# Patient Record
Sex: FEMALE | Employment: FULL TIME | URBAN - METROPOLITAN AREA
[De-identification: names, ages, dates, MRNs, and addresses within clinical notes are randomized per-mention and may not be internally consistent; named-entity substitution may affect disease eponyms.]

---

## 2017-11-11 ENCOUNTER — OFFICE VISIT (OUTPATIENT)
Dept: FAMILY MEDICINE CLINIC | Facility: CLINIC | Age: 37
End: 2017-11-11

## 2017-11-11 VITALS
DIASTOLIC BLOOD PRESSURE: 70 MMHG | TEMPERATURE: 99 F | SYSTOLIC BLOOD PRESSURE: 110 MMHG | HEART RATE: 70 BPM | BODY MASS INDEX: 36 KG/M2 | WEIGHT: 211 LBS

## 2017-11-11 DIAGNOSIS — H60.392 OTHER INFECTIVE ACUTE OTITIS EXTERNA OF LEFT EAR: Primary | ICD-10-CM

## 2017-11-11 PROCEDURE — 99212 OFFICE O/P EST SF 10 MIN: CPT | Performed by: FAMILY MEDICINE

## 2017-11-11 PROCEDURE — 99213 OFFICE O/P EST LOW 20 MIN: CPT | Performed by: FAMILY MEDICINE

## 2017-11-11 NOTE — PROGRESS NOTES
HPI:    Patient ID: Diana Benitez is a 30year old female. HPI  Patient presents with:  Ear Pain: c/o left ear pain  Ear Wax    Review of Systems   Constitutional: Negative. HENT: Positive for ear pain. Respiratory: Negative.              Current

## 2018-01-06 ENCOUNTER — OFFICE VISIT (OUTPATIENT)
Dept: FAMILY MEDICINE CLINIC | Facility: CLINIC | Age: 38
End: 2018-01-06

## 2018-01-06 VITALS
DIASTOLIC BLOOD PRESSURE: 72 MMHG | HEART RATE: 71 BPM | SYSTOLIC BLOOD PRESSURE: 111 MMHG | BODY MASS INDEX: 36.09 KG/M2 | TEMPERATURE: 99 F | HEIGHT: 64.5 IN | WEIGHT: 214 LBS

## 2018-01-06 DIAGNOSIS — Z11.3 ROUTINE SCREENING FOR STI (SEXUALLY TRANSMITTED INFECTION): ICD-10-CM

## 2018-01-06 DIAGNOSIS — Z00.00 ROUTINE GENERAL MEDICAL EXAMINATION AT A HEALTH CARE FACILITY: Primary | ICD-10-CM

## 2018-01-06 PROCEDURE — 99395 PREV VISIT EST AGE 18-39: CPT | Performed by: FAMILY MEDICINE

## 2018-01-06 NOTE — PROGRESS NOTES
HPI:    Patient ID: Kadeem Brooks is a 30year old female. HPI  Patient presents with:  Routine Physical  STD: pt would like STD testing done    History reviewed. No pertinent past medical history. Review of Systems   Constitutional: Negative.     VIKI and normal reflexes. No sensory deficit. Skin:   No suspicious lesions above the waist exam   Psychiatric: Her mood appears not anxious. She does not exhibit a depressed mood.               ASSESSMENT/PLAN:   Routine general medical examination at a [facility]

## 2018-02-03 ENCOUNTER — LAB ENCOUNTER (OUTPATIENT)
Dept: LAB | Age: 38
End: 2018-02-03
Attending: FAMILY MEDICINE
Payer: COMMERCIAL

## 2018-02-03 ENCOUNTER — TELEPHONE (OUTPATIENT)
Dept: FAMILY MEDICINE CLINIC | Facility: CLINIC | Age: 38
End: 2018-02-03

## 2018-02-03 DIAGNOSIS — Z00.00 WELL WOMAN EXAM (NO GYNECOLOGICAL EXAM): Primary | ICD-10-CM

## 2018-02-03 DIAGNOSIS — Z00.00 WELL WOMAN EXAM (NO GYNECOLOGICAL EXAM): ICD-10-CM

## 2018-02-03 DIAGNOSIS — Z00.00 ROUTINE GENERAL MEDICAL EXAMINATION AT A HEALTH CARE FACILITY: ICD-10-CM

## 2018-02-03 LAB
ALBUMIN SERPL BCP-MCNC: 3.6 G/DL (ref 3.5–4.8)
ALBUMIN/GLOB SERPL: 1.1 {RATIO} (ref 1–2)
ALP SERPL-CCNC: 53 U/L (ref 32–100)
ALT SERPL-CCNC: 20 U/L (ref 14–54)
ANION GAP SERPL CALC-SCNC: 7 MMOL/L (ref 0–18)
AST SERPL-CCNC: 21 U/L (ref 15–41)
BASOPHILS # BLD: 0 K/UL (ref 0–0.2)
BASOPHILS NFR BLD: 1 %
BILIRUB SERPL-MCNC: 0.6 MG/DL (ref 0.3–1.2)
BUN SERPL-MCNC: 5 MG/DL (ref 8–20)
BUN/CREAT SERPL: 5.4 (ref 10–20)
CALCIUM SERPL-MCNC: 9.1 MG/DL (ref 8.5–10.5)
CHLORIDE SERPL-SCNC: 106 MMOL/L (ref 95–110)
CHOLEST SERPL-MCNC: 197 MG/DL (ref 110–200)
CO2 SERPL-SCNC: 27 MMOL/L (ref 22–32)
CREAT SERPL-MCNC: 0.93 MG/DL (ref 0.5–1.5)
EOSINOPHIL # BLD: 0.1 K/UL (ref 0–0.7)
EOSINOPHIL NFR BLD: 2 %
ERYTHROCYTE [DISTWIDTH] IN BLOOD BY AUTOMATED COUNT: 13.7 % (ref 11–15)
GLOBULIN PLAS-MCNC: 3.2 G/DL (ref 2.5–3.7)
GLUCOSE SERPL-MCNC: 94 MG/DL (ref 70–99)
HCT VFR BLD AUTO: 40.4 % (ref 35–48)
HDLC SERPL-MCNC: 43 MG/DL
HGB BLD-MCNC: 13.3 G/DL (ref 12–16)
LDLC SERPL CALC-MCNC: 137 MG/DL (ref 0–99)
LYMPHOCYTES # BLD: 1.7 K/UL (ref 1–4)
LYMPHOCYTES NFR BLD: 44 %
MCH RBC QN AUTO: 29.9 PG (ref 27–32)
MCHC RBC AUTO-ENTMCNC: 33.1 G/DL (ref 32–37)
MCV RBC AUTO: 90.4 FL (ref 80–100)
MONOCYTES # BLD: 0.3 K/UL (ref 0–1)
MONOCYTES NFR BLD: 9 %
NEUTROPHILS # BLD AUTO: 1.7 K/UL (ref 1.8–7.7)
NEUTROPHILS NFR BLD: 45 %
NONHDLC SERPL-MCNC: 154 MG/DL
OSMOLALITY UR CALC.SUM OF ELEC: 287 MOSM/KG (ref 275–295)
PLATELET # BLD AUTO: 206 K/UL (ref 140–400)
PMV BLD AUTO: 9.7 FL (ref 7.4–10.3)
POTASSIUM SERPL-SCNC: 4 MMOL/L (ref 3.3–5.1)
PROT SERPL-MCNC: 6.8 G/DL (ref 5.9–8.4)
RBC # BLD AUTO: 4.46 M/UL (ref 3.7–5.4)
SODIUM SERPL-SCNC: 140 MMOL/L (ref 136–144)
TRIGL SERPL-MCNC: 86 MG/DL (ref 1–149)
WBC # BLD AUTO: 3.9 K/UL (ref 4–11)

## 2018-02-03 PROCEDURE — 80053 COMPREHEN METABOLIC PANEL: CPT

## 2018-02-03 PROCEDURE — 87591 N.GONORRHOEAE DNA AMP PROB: CPT

## 2018-02-03 PROCEDURE — 87491 CHLMYD TRACH DNA AMP PROBE: CPT

## 2018-02-03 PROCEDURE — 36415 COLL VENOUS BLD VENIPUNCTURE: CPT

## 2018-02-03 PROCEDURE — 87389 HIV-1 AG W/HIV-1&-2 AB AG IA: CPT

## 2018-02-03 PROCEDURE — 80061 LIPID PANEL: CPT

## 2018-02-03 PROCEDURE — 85025 COMPLETE CBC W/AUTO DIFF WBC: CPT

## 2018-02-03 PROCEDURE — 86780 TREPONEMA PALLIDUM: CPT

## 2018-02-03 NOTE — TELEPHONE ENCOUNTER
Patient was in office asking for a blood test order for all std's including Herpes. Her current order only had clamydia. She is doing the order that she has today and will come back for the others-per patient. Please advise patient when order put in.

## 2018-02-04 LAB
C TRACH DNA SPEC QL NAA+PROBE: NEGATIVE
N GONORRHOEA DNA SPEC QL NAA+PROBE: NEGATIVE

## 2018-02-05 LAB
HIV1+2 AB SERPL QL IA: NONREACTIVE
T PALLIDUM AB SER QL: NEGATIVE

## 2018-02-09 ENCOUNTER — PATIENT MESSAGE (OUTPATIENT)
Dept: FAMILY MEDICINE CLINIC | Facility: CLINIC | Age: 38
End: 2018-02-09

## 2018-02-09 NOTE — TELEPHONE ENCOUNTER
From: Betty Lew  To: Laly Hallman DO  Sent: 2/9/2018 1:36 PM CST  Subject: Test Results Question    Hello,     I haven't heard the results of my most recent blood test. Please advise when it will be available. Thank You.

## 2018-07-13 ENCOUNTER — TELEPHONE (OUTPATIENT)
Dept: FAMILY MEDICINE CLINIC | Facility: CLINIC | Age: 38
End: 2018-07-13

## 2018-07-13 NOTE — TELEPHONE ENCOUNTER
Explained to patient that STD results not posted on MyChart; patient acknowledged, she did not want the results, just curious why not there anymore.

## 2018-07-27 ENCOUNTER — OFFICE VISIT (OUTPATIENT)
Dept: FAMILY MEDICINE CLINIC | Facility: CLINIC | Age: 38
End: 2018-07-27
Payer: COMMERCIAL

## 2018-07-27 VITALS
WEIGHT: 208 LBS | BODY MASS INDEX: 35 KG/M2 | HEART RATE: 65 BPM | SYSTOLIC BLOOD PRESSURE: 118 MMHG | DIASTOLIC BLOOD PRESSURE: 66 MMHG | TEMPERATURE: 99 F

## 2018-07-27 DIAGNOSIS — Z12.4 CERVICAL CANCER SCREENING: ICD-10-CM

## 2018-07-27 DIAGNOSIS — Z01.419 ENCOUNTER FOR GYNECOLOGICAL EXAMINATION WITHOUT ABNORMAL FINDING: ICD-10-CM

## 2018-07-27 DIAGNOSIS — Z01.419 WOMEN'S ANNUAL ROUTINE GYNECOLOGICAL EXAMINATION: Primary | ICD-10-CM

## 2018-07-27 PROCEDURE — 99395 PREV VISIT EST AGE 18-39: CPT | Performed by: FAMILY MEDICINE

## 2018-07-27 NOTE — PROGRESS NOTES
HPI:    Patient ID: Mario Decker is a 30year old female. HPI  Patient presents with:  Eval-G (gynecologic): annual pap smear   Complaint of vaginal discharge. Review of Systems   Constitutional: Negative.     Genitourinary: Positive for vaginal disch

## 2018-07-29 LAB
C TRACH DNA SPEC QL NAA+PROBE: NEGATIVE
GENITAL VAGINOSIS SCREEN: NEGATIVE
N GONORRHOEA DNA SPEC QL NAA+PROBE: NEGATIVE
TRICHOMONAS SCREEN: NEGATIVE

## 2018-08-02 LAB — HPV I/H RISK 1 DNA SPEC QL NAA+PROBE: NEGATIVE

## 2018-08-27 ENCOUNTER — CHARTING TRANS (OUTPATIENT)
Dept: OTHER | Age: 38
End: 2018-08-27

## 2018-10-02 ENCOUNTER — PATIENT MESSAGE (OUTPATIENT)
Dept: FAMILY MEDICINE CLINIC | Facility: CLINIC | Age: 38
End: 2018-10-02

## 2018-10-02 NOTE — TELEPHONE ENCOUNTER
From: Be Peoples  To: Toshia Escobar DO  Sent: 10/2/2018 9:22 AM CDT  Subject: Other    Dr. Soriano Vienna, I just received a bill for 508.78 for the pap that was just done. In the past this cost has always been covered at 100% by my insurance.  I called th

## 2018-10-03 NOTE — TELEPHONE ENCOUNTER
I dont know where this goes, billing should be changed to recognize this is a female well visit, gynecologic exam.

## 2018-10-23 NOTE — TELEPHONE ENCOUNTER
Hospital coding having problems [phone] number, correction request faxed/confirmed to [phone].

## 2018-12-07 ENCOUNTER — OFFICE VISIT (OUTPATIENT)
Dept: OBGYN CLINIC | Facility: CLINIC | Age: 38
End: 2018-12-07
Payer: COMMERCIAL

## 2018-12-07 VITALS
DIASTOLIC BLOOD PRESSURE: 76 MMHG | HEIGHT: 64.5 IN | BODY MASS INDEX: 34.17 KG/M2 | SYSTOLIC BLOOD PRESSURE: 120 MMHG | WEIGHT: 202.63 LBS

## 2018-12-07 DIAGNOSIS — N92.6 IRREGULAR PERIODS: Primary | ICD-10-CM

## 2018-12-07 DIAGNOSIS — N92.0 EXCESSIVE OR FREQUENT MENSTRUATION: ICD-10-CM

## 2018-12-07 PROCEDURE — 99204 OFFICE O/P NEW MOD 45 MIN: CPT | Performed by: OBSTETRICS & GYNECOLOGY
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
## 2018-12-08 VITALS
DIASTOLIC BLOOD PRESSURE: 70 MMHG | WEIGHT: 202.82 LBS | HEART RATE: 67 BPM | TEMPERATURE: 98.2 F | BODY MASS INDEX: 34.63 KG/M2 | RESPIRATION RATE: 16 BRPM | SYSTOLIC BLOOD PRESSURE: 110 MMHG | HEIGHT: 64 IN

## 2018-12-12 ENCOUNTER — PATIENT MESSAGE (OUTPATIENT)
Dept: FAMILY MEDICINE CLINIC | Facility: CLINIC | Age: 38
End: 2018-12-12

## 2018-12-12 ENCOUNTER — HOSPITAL ENCOUNTER (OUTPATIENT)
Dept: ULTRASOUND IMAGING | Age: 38
Discharge: HOME | End: 2018-12-12
Attending: OBSTETRICS & GYNECOLOGY
Payer: COMMERCIAL

## 2018-12-12 DIAGNOSIS — N92.6 IRREGULAR PERIODS: ICD-10-CM

## 2018-12-12 PROCEDURE — 76856 US EXAM PELVIC COMPLETE: CPT | Performed by: OBSTETRICS & GYNECOLOGY

## 2018-12-12 PROCEDURE — 76830 TRANSVAGINAL US NON-OB: CPT | Performed by: OBSTETRICS & GYNECOLOGY

## 2018-12-12 NOTE — TELEPHONE ENCOUNTER
From: Poli Estrada  To: Hakan Freeman DO  Sent: 12/12/2018 9:52 AM CST  Subject: Other    Hello Dr. Radha Odell,    I'm still receiving calls from the billing department regarding my pap. Please let me know why the billing code hasn't been corrected?  My

## 2018-12-18 NOTE — TELEPHONE ENCOUNTER
I can resubmit a different code - I do not know what the insurer is looking for. Please evaluate.  I believe there has been some communication about this in the past.

## 2018-12-19 NOTE — TELEPHONE ENCOUNTER
Dr Jo-Ann Miller, please see 10/2/18 Patient Email encounter. Your CMA handled it.      Please respond to pool: EM [facility] LPN/CMA

## 2018-12-21 NOTE — TELEPHONE ENCOUNTER
Dr Marlena Enrique, please ask your CMA to do as she did for the 10/2/18 issue, she handled that one, there is no documentation in chart of what was done.     Please respond to pool: JERE Jimenez 64 LPN/CMA

## 2018-12-22 NOTE — TELEPHONE ENCOUNTER
Will call billing department on Monday. Office is closed on the weekends. Will call patient once I obtain an update from billing to see what else is needed.

## 2018-12-24 NOTE — TELEPHONE ENCOUNTER
Dr Sophia Juarez billing depart is requesting a new order with the billing code you suggested in October which was Z124 screen for cervical cancer. Once completed I need to fax to billing.    Fax# [phone]

## 2018-12-28 NOTE — TELEPHONE ENCOUNTER
I went into chart, changed Dx and submitted. I am not sure if the actually resubmits the charge or not.

## 2019-01-02 NOTE — TELEPHONE ENCOUNTER
Faxed over Office visit notes to billing department fax# [phone]. Fax was successful. Also notified patient actions that were taken. Patient should contact billing for a status on account.      Billing phone #[phone]  Fax- [phone]  Account

## 2019-01-09 NOTE — TELEPHONE ENCOUNTER
Patient had trouble hearing me over the phone, called from another phone and patient continued to not hear me well. Patient pulled up her account and was able to see that the charges from July were taken care of.

## 2019-06-29 ENCOUNTER — WALK IN (OUTPATIENT)
Dept: URGENT CARE | Age: 39
End: 2019-06-29

## 2019-06-29 VITALS
WEIGHT: 215 LBS | BODY MASS INDEX: 36.7 KG/M2 | TEMPERATURE: 99.6 F | RESPIRATION RATE: 16 BRPM | DIASTOLIC BLOOD PRESSURE: 70 MMHG | HEART RATE: 71 BPM | OXYGEN SATURATION: 99 % | HEIGHT: 64 IN | SYSTOLIC BLOOD PRESSURE: 100 MMHG

## 2019-06-29 DIAGNOSIS — J02.0 STREP PHARYNGITIS: Primary | ICD-10-CM

## 2019-06-29 LAB
INTERNAL PROCEDURAL CONTROLS ACCEPTABLE: YES
S PYO AG THROAT QL IA.RAPID: POSITIVE

## 2019-06-29 PROCEDURE — 87880 STREP A ASSAY W/OPTIC: CPT | Performed by: NURSE PRACTITIONER

## 2019-06-29 PROCEDURE — 99214 OFFICE O/P EST MOD 30 MIN: CPT | Performed by: NURSE PRACTITIONER

## 2019-06-29 RX ORDER — PENICILLIN V POTASSIUM 500 MG/1
500 TABLET ORAL 2 TIMES DAILY
Qty: 20 TABLET | Refills: 0 | Status: SHIPPED | OUTPATIENT
Start: 2019-06-29 | End: 2019-07-09

## 2019-06-29 ASSESSMENT — ENCOUNTER SYMPTOMS
COUGH: 1
HEADACHES: 1
SINUS PAIN: 1
SINUS PRESSURE: 1
HEMATOLOGIC/LYMPHATIC NEGATIVE: 1
SHORTNESS OF BREATH: 0
SORE THROAT: 1
PSYCHIATRIC NEGATIVE: 1
ENDOCRINE NEGATIVE: 1
GASTROINTESTINAL NEGATIVE: 1
FEVER: 0
EYES NEGATIVE: 1
ALLERGIC/IMMUNOLOGIC NEGATIVE: 1
FATIGUE: 1
WEAKNESS: 1

## 2020-02-05 ENCOUNTER — OFFICE VISIT (OUTPATIENT)
Dept: FAMILY MEDICINE CLINIC | Facility: CLINIC | Age: 40
End: 2020-02-05
Payer: COMMERCIAL

## 2020-02-05 VITALS
DIASTOLIC BLOOD PRESSURE: 63 MMHG | HEART RATE: 82 BPM | TEMPERATURE: 100 F | HEIGHT: 64.5 IN | OXYGEN SATURATION: 96 % | SYSTOLIC BLOOD PRESSURE: 118 MMHG | RESPIRATION RATE: 18 BRPM | WEIGHT: 200 LBS | BODY MASS INDEX: 33.73 KG/M2

## 2020-02-05 DIAGNOSIS — J06.9 VIRAL UPPER RESPIRATORY TRACT INFECTION: Primary | ICD-10-CM

## 2020-02-05 DIAGNOSIS — R68.89 FLU-LIKE SYMPTOMS: ICD-10-CM

## 2020-02-05 DIAGNOSIS — R06.00 DYSPNEA, UNSPECIFIED TYPE: ICD-10-CM

## 2020-02-05 LAB
OPERATOR ID: NORMAL
POCT INFLUENZA A: NEGATIVE
POCT INFLUENZA B: NEGATIVE

## 2020-02-05 PROCEDURE — 99213 OFFICE O/P EST LOW 20 MIN: CPT | Performed by: NURSE PRACTITIONER

## 2020-02-05 PROCEDURE — 87502 INFLUENZA DNA AMP PROBE: CPT | Performed by: NURSE PRACTITIONER

## 2020-02-05 RX ORDER — BENZONATATE 200 MG/1
200 CAPSULE ORAL 3 TIMES DAILY PRN
Qty: 15 CAPSULE | Refills: 0 | Status: SHIPPED | OUTPATIENT
Start: 2020-02-05 | End: 2020-02-10

## 2020-02-05 RX ORDER — ALBUTEROL SULFATE 90 UG/1
2 AEROSOL, METERED RESPIRATORY (INHALATION) EVERY 4 HOURS PRN
Qty: 1 INHALER | Refills: 0 | Status: SHIPPED | OUTPATIENT
Start: 2020-02-05

## 2020-02-05 RX ORDER — BENZONATATE 200 MG/1
200 CAPSULE ORAL 3 TIMES DAILY PRN
Qty: 15 CAPSULE | Refills: 0 | Status: SHIPPED | OUTPATIENT
Start: 2020-02-05 | End: 2020-02-05

## 2020-02-05 RX ORDER — ALBUTEROL SULFATE 90 UG/1
2 AEROSOL, METERED RESPIRATORY (INHALATION) EVERY 4 HOURS PRN
Qty: 1 INHALER | Refills: 0 | Status: SHIPPED | OUTPATIENT
Start: 2020-02-05 | End: 2020-02-05

## 2020-02-05 NOTE — PROGRESS NOTES
CHIEF COMPLAINT:   Patient presents with:  Cough  Body ache and/or chills      HPI:   Elida Florez is a 42year old female who presents for upper respiratory symptoms for  2 days.  Patient reports sore throat, congestion, dry cough, body aches, chills, an NOSE: Nostrils patent, clear nasal discharge, nasal mucosa erythematous and swollen. THROAT: Oral mucosa pink, moist. Posterior pharynx is erythematous. No exudates. Tonsils 0/4. NECK: Supple, non-tender  LUNGS: clear to auscultation bilaterally.  Cendant Fliqz You have a viral upper respiratory illness (URI), which is another term for the common cold. This illness is contagious during the first few days. It is spread through the air by coughing and sneezing.  It may also be spread by direct contact (touching the · Over-the-counter cold medicines will not shorten the length of time you’re sick, but they may be helpful for the following symptoms: cough, sore throat, and nasal and sinus congestion.  If you take prescription medicines, ask your healthcare provider or p

## 2021-01-22 ENCOUNTER — TELEMEDICINE (OUTPATIENT)
Dept: FAMILY MEDICINE CLINIC | Facility: CLINIC | Age: 41
End: 2021-01-22

## 2021-01-22 DIAGNOSIS — M75.81 RIGHT ROTATOR CUFF TENDONITIS: ICD-10-CM

## 2021-01-22 DIAGNOSIS — M25.511 ACUTE PAIN OF RIGHT SHOULDER: Primary | ICD-10-CM

## 2021-01-22 PROCEDURE — G2012 BRIEF CHECK IN BY MD/QHP: HCPCS | Performed by: FAMILY MEDICINE

## 2021-01-22 RX ORDER — DICLOFENAC SODIUM 75 MG/1
75 TABLET, DELAYED RELEASE ORAL 2 TIMES DAILY
Qty: 30 TABLET | Refills: 1 | Status: SHIPPED | OUTPATIENT
Start: 2021-01-22 | End: 2021-03-01

## 2021-01-22 NOTE — PROGRESS NOTES
Virtual Telephone Check-In    Mistyobie Alejandro verbally consents to a Virtual/Telephone Check-In visit on 01/22/21. Patient has been referred to the [facility] website at [email or web address] to review the yearly Consent to Treat document.     Patient Dillon Palmer

## 2021-03-01 ENCOUNTER — OFFICE VISIT (OUTPATIENT)
Dept: OBGYN CLINIC | Facility: CLINIC | Age: 41
End: 2021-03-01
Payer: COMMERCIAL

## 2021-03-01 VITALS — BODY MASS INDEX: 34.24 KG/M2 | WEIGHT: 203 LBS | HEIGHT: 64.5 IN

## 2021-03-01 DIAGNOSIS — Z12.4 ROUTINE CERVICAL SMEAR: ICD-10-CM

## 2021-03-01 DIAGNOSIS — N92.6 IRREGULAR PERIODS: ICD-10-CM

## 2021-03-01 DIAGNOSIS — Z11.3 SCREENING EXAMINATION FOR VENEREAL DISEASE: ICD-10-CM

## 2021-03-01 DIAGNOSIS — Z01.419 WOMEN'S ANNUAL ROUTINE GYNECOLOGICAL EXAMINATION: Primary | ICD-10-CM

## 2021-03-01 DIAGNOSIS — Z31.69 PRE-CONCEPTION COUNSELING: ICD-10-CM

## 2021-03-01 PROCEDURE — 99395 PREV VISIT EST AGE 18-39: CPT | Performed by: OBSTETRICS & GYNECOLOGY

## 2021-03-01 PROCEDURE — 99214 OFFICE O/P EST MOD 30 MIN: CPT | Performed by: OBSTETRICS & GYNECOLOGY

## 2021-03-01 PROCEDURE — 88175 CYTOPATH C/V AUTO FLUID REDO: CPT | Performed by: OBSTETRICS & GYNECOLOGY

## 2021-03-01 PROCEDURE — 87205 SMEAR GRAM STAIN: CPT | Performed by: OBSTETRICS & GYNECOLOGY

## 2021-03-01 PROCEDURE — 87808 TRICHOMONAS ASSAY W/OPTIC: CPT | Performed by: OBSTETRICS & GYNECOLOGY

## 2021-03-01 PROCEDURE — 87591 N.GONORRHOEAE DNA AMP PROB: CPT | Performed by: OBSTETRICS & GYNECOLOGY

## 2021-03-01 PROCEDURE — 87491 CHLMYD TRACH DNA AMP PROBE: CPT | Performed by: OBSTETRICS & GYNECOLOGY

## 2021-03-01 PROCEDURE — 3008F BODY MASS INDEX DOCD: CPT | Performed by: OBSTETRICS & GYNECOLOGY

## 2021-03-01 PROCEDURE — 87624 HPV HI-RISK TYP POOLED RSLT: CPT | Performed by: OBSTETRICS & GYNECOLOGY

## 2021-03-01 PROCEDURE — 87106 FUNGI IDENTIFICATION YEAST: CPT | Performed by: OBSTETRICS & GYNECOLOGY

## 2021-03-01 NOTE — PROGRESS NOTES
GYN ANNUAL    3/1/2021  1:20 PM    Patient presents with: Annual: EST/ANNUAL PAP / FERTILITY CONSULT   .    HPI: Patient is a 47year old  LMP 2021 presents for annual gyn exam and to discuss becoming pregnant.  Last seen  for irregular cyc Sexual Activity      Alcohol use:  Yes        Alcohol/week: 0.0 standard drinks        Frequency: Monthly or less        Comment: 1 beer weekly      Drug use: No      Sexual activity: Yes        Partners: Male    Social History    Social History Narrative months  - Return if no success for referral to [facility]      Total time spent = 35 minutes  >50% = counseling regarding pre-conception plan of care        3/1/2021  Deny Johnson MD

## 2021-03-02 LAB
C TRACH DNA SPEC QL NAA+PROBE: NEGATIVE
N GONORRHOEA DNA SPEC QL NAA+PROBE: NEGATIVE

## 2021-03-03 LAB
GENITAL VAGINOSIS SCREEN: NEGATIVE
HPV I/H RISK 1 DNA SPEC QL NAA+PROBE: NEGATIVE
TRICHOMONAS SCREEN: NEGATIVE

## 2021-03-09 ENCOUNTER — TELEPHONE (OUTPATIENT)
Dept: OBGYN CLINIC | Facility: CLINIC | Age: 41
End: 2021-03-09

## 2021-03-09 RX ORDER — FLUCONAZOLE 150 MG/1
150 TABLET ORAL DAILY
Qty: 2 TABLET | Refills: 0 | Status: SHIPPED | OUTPATIENT
Start: 2021-03-09

## 2021-03-09 RX ORDER — CLINDAMYCIN HYDROCHLORIDE 300 MG/1
300 CAPSULE ORAL 2 TIMES DAILY
Qty: 14 CAPSULE | Refills: 0 | Status: SHIPPED | OUTPATIENT
Start: 2021-03-09 | End: 2021-03-16

## 2021-03-09 NOTE — TELEPHONE ENCOUNTER
----- Message from Lynette Ovalle MD sent at 3/4/2021  4:12 PM CST -----  PAP normal, HPV negative. Both BV and yeast detected. Please take oral Diflucan 150 mg x 2 doses 3 days apart followed by 7d of oral Clindamycin 300 mg twice daily.       Tunde Perez

## 2021-06-14 ENCOUNTER — OFFICE VISIT (OUTPATIENT)
Dept: FAMILY MEDICINE CLINIC | Facility: CLINIC | Age: 41
End: 2021-06-14
Payer: COMMERCIAL

## 2021-06-14 ENCOUNTER — TELEPHONE (OUTPATIENT)
Dept: FAMILY MEDICINE CLINIC | Facility: CLINIC | Age: 41
End: 2021-06-14

## 2021-06-14 VITALS
WEIGHT: 202 LBS | HEART RATE: 84 BPM | HEIGHT: 65 IN | DIASTOLIC BLOOD PRESSURE: 78 MMHG | TEMPERATURE: 98 F | SYSTOLIC BLOOD PRESSURE: 118 MMHG | BODY MASS INDEX: 33.66 KG/M2

## 2021-06-14 DIAGNOSIS — M54.9 UPPER BACK PAIN: ICD-10-CM

## 2021-06-14 DIAGNOSIS — G89.11 ACUTE PAIN OF RIGHT SHOULDER DUE TO TRAUMA: ICD-10-CM

## 2021-06-14 DIAGNOSIS — S40.022A CONTUSION OF ARM, LEFT, INITIAL ENCOUNTER: ICD-10-CM

## 2021-06-14 DIAGNOSIS — M25.511 ACUTE PAIN OF RIGHT SHOULDER DUE TO TRAUMA: ICD-10-CM

## 2021-06-14 DIAGNOSIS — S80.01XA CONTUSION OF RIGHT KNEE AND LOWER LEG, INITIAL ENCOUNTER: ICD-10-CM

## 2021-06-14 DIAGNOSIS — S80.11XA CONTUSION OF RIGHT KNEE AND LOWER LEG, INITIAL ENCOUNTER: ICD-10-CM

## 2021-06-14 DIAGNOSIS — T74.91XA DOMESTIC ABUSE OF ADULT, INITIAL ENCOUNTER: Primary | ICD-10-CM

## 2021-06-14 PROCEDURE — 3008F BODY MASS INDEX DOCD: CPT | Performed by: STUDENT IN AN ORGANIZED HEALTH CARE EDUCATION/TRAINING PROGRAM

## 2021-06-14 PROCEDURE — 3074F SYST BP LT 130 MM HG: CPT | Performed by: STUDENT IN AN ORGANIZED HEALTH CARE EDUCATION/TRAINING PROGRAM

## 2021-06-14 PROCEDURE — 3078F DIAST BP <80 MM HG: CPT | Performed by: STUDENT IN AN ORGANIZED HEALTH CARE EDUCATION/TRAINING PROGRAM

## 2021-06-14 PROCEDURE — 99214 OFFICE O/P EST MOD 30 MIN: CPT | Performed by: STUDENT IN AN ORGANIZED HEALTH CARE EDUCATION/TRAINING PROGRAM

## 2021-06-14 RX ORDER — METHOCARBAMOL 750 MG/1
750 TABLET, FILM COATED ORAL 4 TIMES DAILY PRN
Qty: 90 TABLET | Refills: 0 | Status: SHIPPED | OUTPATIENT
Start: 2021-06-14

## 2021-06-14 RX ORDER — IBUPROFEN 600 MG/1
600 TABLET ORAL EVERY 6 HOURS PRN
Qty: 60 TABLET | Refills: 0 | Status: SHIPPED | OUTPATIENT
Start: 2021-06-14
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
## 2021-06-29 PROBLEM — M25.511 ACUTE PAIN OF RIGHT SHOULDER DUE TO TRAUMA: Status: ACTIVE | Noted: 2021-06-29

## 2021-06-29 PROBLEM — T74.91XA DOMESTIC ABUSE OF ADULT, INITIAL ENCOUNTER: Status: ACTIVE | Noted: 2021-06-29

## 2021-06-29 PROBLEM — G89.11 ACUTE PAIN OF RIGHT SHOULDER DUE TO TRAUMA: Status: ACTIVE | Noted: 2021-06-29

## 2021-06-29 PROBLEM — M54.9 UPPER BACK PAIN: Status: ACTIVE | Noted: 2021-06-29
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
## 2021-09-28 ENCOUNTER — PATIENT MESSAGE (OUTPATIENT)
Dept: FAMILY MEDICINE CLINIC | Facility: CLINIC | Age: 41
End: 2021-09-28

## 2021-09-28 DIAGNOSIS — M25.561 CHRONIC PAIN OF RIGHT KNEE: Primary | ICD-10-CM

## 2021-09-28 DIAGNOSIS — G89.29 CHRONIC PAIN OF RIGHT KNEE: Primary | ICD-10-CM
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
## 2021-09-29 ENCOUNTER — HOSPITAL ENCOUNTER (OUTPATIENT)
Dept: GENERAL RADIOLOGY | Age: 41
Discharge: HOME | End: 2021-09-29
Attending: STUDENT IN AN ORGANIZED HEALTH CARE EDUCATION/TRAINING PROGRAM
Payer: COMMERCIAL

## 2021-09-29 DIAGNOSIS — G89.11 ACUTE PAIN OF RIGHT SHOULDER DUE TO TRAUMA: ICD-10-CM

## 2021-09-29 DIAGNOSIS — M25.511 ACUTE PAIN OF RIGHT SHOULDER DUE TO TRAUMA: ICD-10-CM

## 2021-09-29 DIAGNOSIS — T74.91XA DOMESTIC ABUSE OF ADULT, INITIAL ENCOUNTER: ICD-10-CM

## 2021-09-29 NOTE — TELEPHONE ENCOUNTER
Krupa Hughes from Radiology dept in [facility] states patient currently there, but thought Dr. River Garcia was going to order x-ray of right knee as per message below.

## 2021-09-30 ENCOUNTER — TELEMEDICINE (OUTPATIENT)
Dept: FAMILY MEDICINE CLINIC | Facility: CLINIC | Age: 41
End: 2021-09-30
Payer: COMMERCIAL

## 2021-09-30 DIAGNOSIS — M25.561 CHRONIC PAIN OF RIGHT KNEE: Primary | ICD-10-CM

## 2021-09-30 DIAGNOSIS — G89.29 CHRONIC PAIN OF RIGHT KNEE: Primary | ICD-10-CM

## 2021-09-30 DIAGNOSIS — S80.11XA CONTUSION OF RIGHT KNEE AND LOWER LEG, INITIAL ENCOUNTER: ICD-10-CM

## 2021-09-30 DIAGNOSIS — S80.01XA CONTUSION OF RIGHT KNEE AND LOWER LEG, INITIAL ENCOUNTER: ICD-10-CM

## 2021-09-30 PROCEDURE — 99213 OFFICE O/P EST LOW 20 MIN: CPT | Performed by: STUDENT IN AN ORGANIZED HEALTH CARE EDUCATION/TRAINING PROGRAM

## 2021-09-30 NOTE — PROGRESS NOTES
Virtual Telephone Check-In    Elida Florez verbally consents to a Virtual/Telephone Check-In visit on 09/30/21. Patient has been referred to the [facility] website at [email or web address] to review the yearly Consent to Treat document.     Patient Kassandra care above. Coding/billing information is submitted for this visit based on complexity of care and/or time spent for the visit. HPI:    Patient ID: Betty Lew is a 46year old female.     HPI  Pt presenting via video visit with ongoing Right knee pa appropriately. Patient did not appear short of breath. ASSESSMENT/PLAN:   1.  Chronic pain of right knee  - will check knee XR  - encouraged to increase hydration and rest as able  - Tylenol/NSAIDs as needed  - PT for pain management  - follow-up w

## 2021-10-04 ENCOUNTER — HOSPITAL ENCOUNTER (OUTPATIENT)
Dept: GENERAL RADIOLOGY | Age: 41
Discharge: HOME | End: 2021-10-04
Attending: STUDENT IN AN ORGANIZED HEALTH CARE EDUCATION/TRAINING PROGRAM
Payer: COMMERCIAL

## 2021-10-04 DIAGNOSIS — M25.561 CHRONIC PAIN OF RIGHT KNEE: ICD-10-CM

## 2021-10-04 DIAGNOSIS — G89.29 CHRONIC PAIN OF RIGHT KNEE: ICD-10-CM

## 2021-10-04 DIAGNOSIS — S80.01XA CONTUSION OF RIGHT KNEE AND LOWER LEG, INITIAL ENCOUNTER: ICD-10-CM

## 2021-10-04 DIAGNOSIS — S80.11XA CONTUSION OF RIGHT KNEE AND LOWER LEG, INITIAL ENCOUNTER: ICD-10-CM

## 2021-10-04 PROCEDURE — 73562 X-RAY EXAM OF KNEE 3: CPT | Performed by: STUDENT IN AN ORGANIZED HEALTH CARE EDUCATION/TRAINING PROGRAM

## 2021-12-12 ENCOUNTER — TELEPHONE (OUTPATIENT)
Dept: FAMILY MEDICINE CLINIC | Facility: CLINIC | Age: 41
End: 2021-12-12

## 2021-12-12 RX ORDER — PREDNISONE 20 MG/1
TABLET ORAL
Qty: 10 TABLET | Refills: 0 | Status: SHIPPED | OUTPATIENT
Start: 2021-12-12

## 2021-12-12 NOTE — TELEPHONE ENCOUNTER
On call  Patient calling answering service due to loss of voice  Patient reports she had a performance 2 days ago and after that she has been having discomfort in throat as well as hoarseness, she states symptoms have been present for 2 days with no improv

## 2022-07-28 ENCOUNTER — OFFICE VISIT (OUTPATIENT)
Dept: FAMILY MEDICINE CLINIC | Facility: CLINIC | Age: 42
End: 2022-07-28
Payer: COMMERCIAL

## 2022-07-28 VITALS
SYSTOLIC BLOOD PRESSURE: 97 MMHG | DIASTOLIC BLOOD PRESSURE: 64 MMHG | BODY MASS INDEX: 33.32 KG/M2 | HEART RATE: 71 BPM | HEIGHT: 65 IN | WEIGHT: 200 LBS

## 2022-07-28 DIAGNOSIS — G89.29 CHRONIC PAIN OF RIGHT KNEE: ICD-10-CM

## 2022-07-28 DIAGNOSIS — Z00.00 ROUTINE GENERAL MEDICAL EXAMINATION AT A HEALTH CARE FACILITY: Primary | ICD-10-CM

## 2022-07-28 DIAGNOSIS — M25.561 CHRONIC PAIN OF RIGHT KNEE: ICD-10-CM

## 2022-07-28 PROBLEM — T74.91XA DOMESTIC ABUSE OF ADULT, INITIAL ENCOUNTER: Status: RESOLVED | Noted: 2021-06-29 | Resolved: 2022-07-28

## 2022-07-28 PROCEDURE — 3008F BODY MASS INDEX DOCD: CPT | Performed by: FAMILY MEDICINE

## 2022-07-28 PROCEDURE — 99395 PREV VISIT EST AGE 18-39: CPT | Performed by: FAMILY MEDICINE

## 2022-07-28 PROCEDURE — 3074F SYST BP LT 130 MM HG: CPT | Performed by: FAMILY MEDICINE

## 2022-07-28 PROCEDURE — 3078F DIAST BP <80 MM HG: CPT | Performed by: FAMILY MEDICINE

## 2022-08-19 ENCOUNTER — OFFICE VISIT (OUTPATIENT)
Dept: OBGYN CLINIC | Facility: CLINIC | Age: 42
End: 2022-08-19
Payer: COMMERCIAL

## 2022-08-19 VITALS
DIASTOLIC BLOOD PRESSURE: 72 MMHG | SYSTOLIC BLOOD PRESSURE: 100 MMHG | HEIGHT: 64 IN | WEIGHT: 203 LBS | BODY MASS INDEX: 34.66 KG/M2

## 2022-08-19 DIAGNOSIS — Z12.31 BREAST CANCER SCREENING BY MAMMOGRAM: ICD-10-CM

## 2022-08-19 DIAGNOSIS — Z01.419 WOMEN'S ANNUAL ROUTINE GYNECOLOGICAL EXAMINATION: Primary | ICD-10-CM

## 2022-08-19 DIAGNOSIS — Z91.89 AT RISK FOR FERTILITY PROBLEMS: ICD-10-CM

## 2022-08-19 PROCEDURE — 87624 HPV HI-RISK TYP POOLED RSLT: CPT | Performed by: OBSTETRICS & GYNECOLOGY

## 2022-08-19 PROCEDURE — 87491 CHLMYD TRACH DNA AMP PROBE: CPT | Performed by: OBSTETRICS & GYNECOLOGY

## 2022-08-19 PROCEDURE — 87591 N.GONORRHOEAE DNA AMP PROB: CPT | Performed by: OBSTETRICS & GYNECOLOGY

## 2022-08-22 LAB
C TRACH DNA SPEC QL NAA+PROBE: NEGATIVE
HPV I/H RISK 1 DNA SPEC QL NAA+PROBE: NEGATIVE
N GONORRHOEA DNA SPEC QL NAA+PROBE: NEGATIVE

## 2022-09-19 ENCOUNTER — HOSPITAL ENCOUNTER (OUTPATIENT)
Dept: MAMMOGRAPHY | Age: 42
Discharge: HOME | End: 2022-09-19
Attending: OBSTETRICS & GYNECOLOGY

## 2022-09-19 DIAGNOSIS — Z12.31 BREAST CANCER SCREENING BY MAMMOGRAM: ICD-10-CM

## 2022-09-19 PROCEDURE — 77067 SCR MAMMO BI INCL CAD: CPT | Performed by: OBSTETRICS & GYNECOLOGY

## 2022-09-19 PROCEDURE — 77063 BREAST TOMOSYNTHESIS BI: CPT | Performed by: OBSTETRICS & GYNECOLOGY

## 2022-12-13 ENCOUNTER — TELEMEDICINE (OUTPATIENT)
Dept: TELEHEALTH | Age: 42
End: 2022-12-13

## 2022-12-13 DIAGNOSIS — J01.00 ACUTE NON-RECURRENT MAXILLARY SINUSITIS: Primary | ICD-10-CM

## 2022-12-13 PROCEDURE — 99213 OFFICE O/P EST LOW 20 MIN: CPT | Performed by: NURSE PRACTITIONER

## 2022-12-13 RX ORDER — FLUTICASONE PROPIONATE 50 MCG
2 SPRAY, SUSPENSION (ML) NASAL DAILY
Qty: 1 EACH | Refills: 0 | Status: SHIPPED | OUTPATIENT
Start: 2022-12-13

## 2022-12-13 RX ORDER — AMOXICILLIN AND CLAVULANATE POTASSIUM 875; 125 MG/1; MG/1
1 TABLET, FILM COATED ORAL 2 TIMES DAILY
Qty: 14 TABLET | Refills: 0 | Status: SHIPPED | OUTPATIENT
Start: 2022-12-13 | End: 2022-12-20

## 2022-12-14 NOTE — PATIENT INSTRUCTIONS
Medication as prescribed  Continue flonase and decongestant for the next few days  Follow up in person for any new or worsening symptoms.

## 2023-02-23 ENCOUNTER — LAB ENCOUNTER (OUTPATIENT)
Dept: LAB | Age: 43
End: 2023-02-23
Attending: FAMILY MEDICINE
Payer: COMMERCIAL

## 2023-02-23 ENCOUNTER — HOSPITAL ENCOUNTER (OUTPATIENT)
Age: 43
Discharge: HOME | End: 2023-02-23
Payer: COMMERCIAL

## 2023-02-23 VITALS
OXYGEN SATURATION: 100 % | RESPIRATION RATE: 19 BRPM | DIASTOLIC BLOOD PRESSURE: 44 MMHG | SYSTOLIC BLOOD PRESSURE: 124 MMHG | HEART RATE: 60 BPM | TEMPERATURE: 98 F

## 2023-02-23 DIAGNOSIS — H91.92 HEARING DEFICIT, LEFT: Primary | ICD-10-CM

## 2023-02-23 DIAGNOSIS — Z00.00 ROUTINE GENERAL MEDICAL EXAMINATION AT A HEALTH CARE FACILITY: ICD-10-CM

## 2023-02-23 LAB
ALBUMIN SERPL-MCNC: 3.2 G/DL (ref 3.4–5)
ALBUMIN/GLOB SERPL: 0.8 {RATIO} (ref 1–2)
ALP LIVER SERPL-CCNC: 59 U/L
ALT SERPL-CCNC: 19 U/L
ANION GAP SERPL CALC-SCNC: 6 MMOL/L (ref 0–18)
AST SERPL-CCNC: 12 U/L (ref 15–37)
BASOPHILS # BLD AUTO: 0.02 X10(3) UL (ref 0–0.2)
BASOPHILS NFR BLD AUTO: 0.5 %
BILIRUB SERPL-MCNC: 0.5 MG/DL (ref 0.1–2)
BUN BLD-MCNC: 8 MG/DL (ref 7–18)
BUN/CREAT SERPL: 8.8 (ref 10–20)
CALCIUM BLD-MCNC: 8.8 MG/DL (ref 8.5–10.1)
CHLORIDE SERPL-SCNC: 108 MMOL/L (ref 98–112)
CHOLEST SERPL-MCNC: 221 MG/DL (ref ?–200)
CO2 SERPL-SCNC: 27 MMOL/L (ref 21–32)
CREAT BLD-MCNC: 0.91 MG/DL
DEPRECATED RDW RBC AUTO: 43.8 FL (ref 35.1–46.3)
EOSINOPHIL # BLD AUTO: 0.08 X10(3) UL (ref 0–0.7)
EOSINOPHIL NFR BLD AUTO: 1.8 %
ERYTHROCYTE [DISTWIDTH] IN BLOOD BY AUTOMATED COUNT: 13 % (ref 11–15)
EST. AVERAGE GLUCOSE BLD GHB EST-MCNC: 100 MG/DL (ref 68–126)
FASTING PATIENT LIPID ANSWER: YES
FASTING STATUS PATIENT QL REPORTED: YES
GFR SERPLBLD BASED ON 1.73 SQ M-ARVRAT: 82 ML/MIN/1.73M2 (ref 60–?)
GLOBULIN PLAS-MCNC: 3.9 G/DL (ref 2.8–4.4)
GLUCOSE BLD-MCNC: 88 MG/DL (ref 70–99)
HBA1C MFR BLD: 5.1 % (ref ?–5.7)
HCT VFR BLD AUTO: 41.4 %
HDLC SERPL-MCNC: 66 MG/DL (ref 40–59)
HGB BLD-MCNC: 13.6 G/DL
IMM GRANULOCYTES # BLD AUTO: 0.01 X10(3) UL (ref 0–1)
IMM GRANULOCYTES NFR BLD: 0.2 %
LDLC SERPL CALC-MCNC: 141 MG/DL (ref ?–100)
LYMPHOCYTES # BLD AUTO: 1.78 X10(3) UL (ref 1–4)
LYMPHOCYTES NFR BLD AUTO: 40.4 %
MCH RBC QN AUTO: 30.2 PG (ref 26–34)
MCHC RBC AUTO-ENTMCNC: 32.9 G/DL (ref 31–37)
MCV RBC AUTO: 92 FL
MONOCYTES # BLD AUTO: 0.36 X10(3) UL (ref 0.1–1)
MONOCYTES NFR BLD AUTO: 8.2 %
NEUTROPHILS # BLD AUTO: 2.16 X10 (3) UL (ref 1.5–7.7)
NEUTROPHILS # BLD AUTO: 2.16 X10(3) UL (ref 1.5–7.7)
NEUTROPHILS NFR BLD AUTO: 48.9 %
NONHDLC SERPL-MCNC: 155 MG/DL (ref ?–130)
OSMOLALITY SERPL CALC.SUM OF ELEC: 290 MOSM/KG (ref 275–295)
PLATELET # BLD AUTO: 209 10(3)UL (ref 150–450)
POTASSIUM SERPL-SCNC: 4.2 MMOL/L (ref 3.5–5.1)
PROT SERPL-MCNC: 7.1 G/DL (ref 6.4–8.2)
RBC # BLD AUTO: 4.5 X10(6)UL
SODIUM SERPL-SCNC: 141 MMOL/L (ref 136–145)
TRIGL SERPL-MCNC: 82 MG/DL (ref 30–149)
VLDLC SERPL CALC-MCNC: 15 MG/DL (ref 0–30)
WBC # BLD AUTO: 4.4 X10(3) UL (ref 4–11)

## 2023-02-23 PROCEDURE — 80061 LIPID PANEL: CPT

## 2023-02-23 PROCEDURE — 80053 COMPREHEN METABOLIC PANEL: CPT

## 2023-02-23 PROCEDURE — 99213 OFFICE O/P EST LOW 20 MIN: CPT | Performed by: NURSE PRACTITIONER

## 2023-02-23 PROCEDURE — 85025 COMPLETE CBC W/AUTO DIFF WBC: CPT

## 2023-02-23 PROCEDURE — 83036 HEMOGLOBIN GLYCOSYLATED A1C: CPT

## 2023-02-23 PROCEDURE — 36415 COLL VENOUS BLD VENIPUNCTURE: CPT

## 2023-02-27 ENCOUNTER — OFFICE VISIT (OUTPATIENT)
Dept: AUDIOLOGY | Facility: CLINIC | Age: 43
End: 2023-02-27

## 2023-02-27 ENCOUNTER — OFFICE VISIT (OUTPATIENT)
Dept: OTOLARYNGOLOGY | Facility: CLINIC | Age: 43
End: 2023-02-27

## 2023-02-27 DIAGNOSIS — M26.609 TMJ (TEMPOROMANDIBULAR JOINT DISORDER): ICD-10-CM

## 2023-02-27 DIAGNOSIS — H91.90 HEARING LOSS, UNSPECIFIED HEARING LOSS TYPE, UNSPECIFIED LATERALITY: Primary | ICD-10-CM

## 2023-02-27 DIAGNOSIS — Z01.10 ENCOUNTER FOR EXAM OF EARS AND HEARING W/O ABNORMAL FINDINGS: Primary | ICD-10-CM

## 2023-02-27 PROCEDURE — 99203 OFFICE O/P NEW LOW 30 MIN: CPT | Performed by: STUDENT IN AN ORGANIZED HEALTH CARE EDUCATION/TRAINING PROGRAM

## 2023-02-27 PROCEDURE — 92504 EAR MICROSCOPY EXAMINATION: CPT | Performed by: STUDENT IN AN ORGANIZED HEALTH CARE EDUCATION/TRAINING PROGRAM

## 2023-12-19 ENCOUNTER — TELEMEDICINE (OUTPATIENT)
Dept: TELEHEALTH | Age: 43
End: 2023-12-19
Payer: COMMERCIAL

## 2023-12-19 DIAGNOSIS — J32.0 LEFT MAXILLARY SINUSITIS: Primary | ICD-10-CM

## 2023-12-19 PROCEDURE — 99213 OFFICE O/P EST LOW 20 MIN: CPT | Performed by: PHYSICIAN ASSISTANT

## 2023-12-19 RX ORDER — AMOXICILLIN AND CLAVULANATE POTASSIUM 875; 125 MG/1; MG/1
1 TABLET, FILM COATED ORAL 2 TIMES DAILY
Qty: 14 TABLET | Refills: 0 | Status: SHIPPED | OUTPATIENT
Start: 2023-12-19 | End: 2023-12-26

## 2023-12-19 NOTE — PROGRESS NOTES
Virtual/Telephone Check-In    Aren Villafuerte verbally consents to a Virtual/Telephone Check-In service on 12/19/23. Patient has been referred to the [facility] website at [email or web address] to review the yearly Consent to Treat document. Patient understands and accepts financial responsibility for any deductible, co-insurance and/or co-pays associated with this service. Telehealth Verbal Consent   I conducted a telehealth visit with Aren Villafuerte today, 12/19/23, which was completed using two-way, real-time interactive audio and video communication. This has been done in good matthew to provide continuity of care in the best interest of the provider-patient relationship, due to the COVID -19 public health crisis/national emergency where restrictions of face-to-face office visits are ongoing. Every conscious effort was taken to allow for sufficient and adequate time to complete the visit. The patient was made aware of the limitations of the telehealth visit, including treatment limitations as no physical exam could be performed. The patient was advised to call 911 or to go to the ER in case there was an emergency. The patient was also advised of the potential privacy & security concerns related to the telehealth platform. The patient was made aware of where to find [facility] notice of privacy practices, telehealth consent form and other related consent forms and documents. which are located on the [facility] website. The patient verbally agreed to telehealth consent form, related consents and the risks discussed. Lastly, the patient confirmed that they were in [place]. Included in this visit, time may have been spent reviewing labs, medications, radiology tests and decision making. Appropriate medical decision-making and tests are ordered as detailed in the plan of care above. Coding/billing information is submitted for this visit based on complexity of care and/or time spent for the visit.     CHIEF COMPLAINT:     Chief Complaint   Patient presents with    Sinus Problem       HPI:   Bella Reyna is a 41year old female who presents for a video visit. Patient reports ill symptoms for 5 days. Lots of PND and thick discharge. Reports some maxillary sinus pressure mainly on left side. Reports cough due to the PND kept her awake last night. No fever or body aches. Appetite is normal.  Fluid intake is good. Lots of tea. Ears feel stuffy. Throat lozenges, ginger/mint/lemon, Mucinex which is helping make sx more manageable. Home COVID testing negative yesterday. Current Outpatient Medications   Medication Sig Dispense Refill    fluticasone propionate 50 MCG/ACT Nasal Suspension 2 sprays by Each Nare route daily. 1 each 0    Albuterol Sulfate  (90 Base) MCG/ACT Inhalation Aero Soln Inhale 2 puffs into the lungs every 4 (four) hours as needed for Wheezing. 1 Inhaler 0      History reviewed. No pertinent past medical history. Past Surgical History:   Procedure Laterality Date    PATIENT DENIES ANY SURGICAL HISTORY      as of 12/07/2018         Social History     Socioeconomic History    Marital status: Single   Occupational History    Occupation:    Tobacco Use    Smoking status: Never    Smokeless tobacco: Never   Vaping Use    Vaping Use: Never used   Substance and Sexual Activity    Alcohol use:  Yes     Alcohol/week: 0.0 standard drinks of alcohol     Comment: 1 beer weekly    Drug use: No    Sexual activity: Yes     Partners: Male   Other Topics Concern    Blood Transfusions No    Caffeine Concern Yes     Comment: coffee, 1 cup daily   Social History Narrative    No H/O abuse          REVIEW OF SYSTEMS:   GENERAL: normal appetite  SKIN: no rashes or abnormal skin lesions  HEENT: See HPI  LUNGS: denies shortness of breath or wheezing, See HPI  CARDIOVASCULAR: denies chest pain or palpitations       EXAM:   General: Alert, Ill-appearing/sounding, and In no acute distress  Respiratory:   Speaking in full sentences comfortably  Normal work of breathing  Coughing during visit   Head: Normocephalic  Nose: + sounds congested/nasally. Blowing nose frequently  Skin: No obvious rashes or lesions from what observed. No results found for this or any previous visit (from the past 24 hour(s)). ASSESSMENT AND PLAN:   Arturo Murphy is a 41year old female who presents with symptoms that are consistent with    ASSESSMENT:   Encounter Diagnosis   Name Primary? Left maxillary sinusitis Yes       PLAN: Due to hx and unilateral sinus pain will cover for ABRS. Did discuss at 5 days of sx could be viral still, should use Flonase, Mucinex, pain relievers, fluids. Meds as below. See patient Instructions    Meds & Refills for this Visit:  Requested Prescriptions     Signed Prescriptions Disp Refills    amoxicillin clavulanate 875-125 MG Oral Tab 14 tablet 0     Sig: Take 1 tablet by mouth 2 (two) times daily for 7 days. Risks, benefits, and side effects of medication explained and discussed. The patient indicates understanding of these issues and agrees to the plan. The patient is asked to return if sx's persist or worsen. Face to face time spent on Video Visit: 7  Total Time spent on visit including reviewing history, ordering labs/medication, patient examination and education: [address] understands video visit evaluation is not a substitute for face-to-face examination or emergency care. Patient advised to go to ER or call 911 for worsening symptoms or acute distress.

## 2024-01-03 ENCOUNTER — PATIENT MESSAGE (OUTPATIENT)
Dept: OBGYN CLINIC | Facility: CLINIC | Age: 44
End: 2024-01-03

## 2024-01-03 DIAGNOSIS — N97.9 FEMALE INFERTILITY: Primary | ICD-10-CM

## 2024-01-24 ENCOUNTER — OFFICE VISIT (OUTPATIENT)
Dept: OBGYN CLINIC | Facility: CLINIC | Age: 44
End: 2024-01-24
Payer: COMMERCIAL

## 2024-01-24 VITALS
WEIGHT: 206 LBS | DIASTOLIC BLOOD PRESSURE: 68 MMHG | HEIGHT: 64 IN | SYSTOLIC BLOOD PRESSURE: 112 MMHG | BODY MASS INDEX: 35.17 KG/M2

## 2024-01-24 DIAGNOSIS — Z91.89 AT RISK FOR FERTILITY PROBLEMS: ICD-10-CM

## 2024-01-24 DIAGNOSIS — Z12.31 BREAST CANCER SCREENING BY MAMMOGRAM: ICD-10-CM

## 2024-01-24 DIAGNOSIS — Z11.3 SCREENING EXAMINATION FOR VENEREAL DISEASE: ICD-10-CM

## 2024-01-24 DIAGNOSIS — Z01.419 WOMEN'S ANNUAL ROUTINE GYNECOLOGICAL EXAMINATION: ICD-10-CM

## 2024-01-24 DIAGNOSIS — Z12.4 SCREENING FOR CERVICAL CANCER: Primary | ICD-10-CM

## 2024-01-24 PROCEDURE — 99213 OFFICE O/P EST LOW 20 MIN: CPT | Performed by: OBSTETRICS & GYNECOLOGY

## 2024-01-24 PROCEDURE — 87591 N.GONORRHOEAE DNA AMP PROB: CPT | Performed by: OBSTETRICS & GYNECOLOGY

## 2024-01-24 PROCEDURE — 99396 PREV VISIT EST AGE 40-64: CPT | Performed by: OBSTETRICS & GYNECOLOGY

## 2024-01-24 PROCEDURE — 87624 HPV HI-RISK TYP POOLED RSLT: CPT | Performed by: OBSTETRICS & GYNECOLOGY

## 2024-01-24 PROCEDURE — 3078F DIAST BP <80 MM HG: CPT | Performed by: OBSTETRICS & GYNECOLOGY

## 2024-01-24 PROCEDURE — 3074F SYST BP LT 130 MM HG: CPT | Performed by: OBSTETRICS & GYNECOLOGY

## 2024-01-24 PROCEDURE — 3008F BODY MASS INDEX DOCD: CPT | Performed by: OBSTETRICS & GYNECOLOGY

## 2024-01-24 PROCEDURE — 87491 CHLMYD TRACH DNA AMP PROBE: CPT | Performed by: OBSTETRICS & GYNECOLOGY

## 2024-01-24 NOTE — PROGRESS NOTES
GYN ANNUAL    2024  4:53 PM    Chief Complaint   Patient presents with    Annual     Annual exam/pap exam     Other     Pt would like HSG and AMH, FSH levels drawn    .    HPI: Patient is a 43 year old  LMP 24 here for annual gyn exam and PAP with STD screen for pre-fertility evaluation - still needs AMH, FSH, and tubal patency evaluation. Cycles are regular with no other gynecologic concerns. To consult with my partner Dr. Becki Mohr for planning and scheduling of HSG vs in-office Femvue.      OB History    Para Term  AB Living   0 0 0 0 0 0   SAB IAB Ectopic Multiple Live Births   0 0 0 0 0         GYN hx:    Hx Prior Abnormal Pap: No  Pap Date: 22  Pap Result Notes: NEGATIVE PAP HPV NEG  Follow Up Recommendation: last mammo: 2022  CONTRACEPTION: None  LAST MAMMOGRAM: To schedule      Current Outpatient Medications   Medication Sig Dispense Refill    fluticasone propionate 50 MCG/ACT Nasal Suspension 2 sprays by Each Nare route daily. 1 each 0       Past Medical History:   Diagnosis Date    Patient denies medical problems     24     Past Surgical History:   Procedure Laterality Date    PATIENT DENIES ANY SURGICAL HISTORY      as of 2024     No Known Allergies  Family History   Problem Relation Age of Onset    Hypertension Father     Hypertension Mother     Breast Cancer Maternal Aunt     Ovarian Cancer Neg     Uterine Cancer Neg     Colon Cancer Neg      Social History     Socioeconomic History    Marital status: Single   Occupational History    Occupation:    Tobacco Use    Smoking status: Never    Smokeless tobacco: Never   Vaping Use    Vaping Use: Never used   Substance and Sexual Activity    Alcohol use: Yes     Alcohol/week: 0.0 standard drinks of alcohol     Comment: 1 beer weekly    Drug use: No    Sexual activity: Yes     Partners: Male     Social History     Social History Narrative    No H/O abuse        ROS:     Review of  Systems:  A comprehensive 10 point ROS was completed. All pertinent positives and negatives noted in the HPI        /68   Ht 64\"   Wt 206 lb (93.4 kg)   LMP 01/16/2024 (Exact Date)   BMI 35.36 kg/m²     Exam:   GENERAL: well developed, well nourished, in no apparent distress  SKIN: no rashes, no lesions  HEENT: normal  LUNGS: respiration unlabored  CARDIOVASCULAR: no peripheral edema or varicosities, skin warm and dry  BREASTS: bilaterally nontender, no palpable masses, no nipple discharge, no skin changes, no axillary adenopathy  ABDOMEN: Soft, non distended; non tender, no masses  GYNE/:   External Genitalia: normal, no lesions, good perineal support  Urethra: meatus normal  Bladder: well supported  Vagina: normal mucosa, no lesions, no discharge   Uterus: normal size, mobile, nontender  Cervix:  normal os, no lesions or bleeding  Adnexa: normal size, bilaterally nontender, no palpable masses  Cul-de-sac: normal  R/V: normal perineum, no hemorrhoids  EXTREMITIES: nontender without edema      A/P: Patient is 43 year old female here for well-woman exam.     1. Women's annual routine gynecological examination  - PAP today    2. At risk for fertility problems  - Anti-Müllerian Hormone (AMH) ([employer] [employer]); Future  - FSH; Future  - Consult Dr. Mohr to discuss tubal patency evaluation options    3. Screening examination for venereal disease  - Chlamydia/GC PCR Combo; Future    4. Breast cancer screening by mammogram  - [facility] JUVENAL 2D+3D SCREENING BILAT (CPT=77067/63603); Future      Total time spent = 30 minutes  >50% = face to face discussion and coordination of care        1/24/2024  Alicia Joe MD

## 2024-02-03 ENCOUNTER — HOSPITAL ENCOUNTER (OUTPATIENT)
Age: 44
Discharge: HOME | End: 2024-02-03
Payer: COMMERCIAL

## 2024-02-03 VITALS
SYSTOLIC BLOOD PRESSURE: 125 MMHG | TEMPERATURE: 98 F | DIASTOLIC BLOOD PRESSURE: 62 MMHG | RESPIRATION RATE: 20 BRPM | OXYGEN SATURATION: 100 % | HEART RATE: 63 BPM

## 2024-02-03 DIAGNOSIS — H66.002 ACUTE SUPPURATIVE OTITIS MEDIA OF LEFT EAR WITHOUT SPONTANEOUS RUPTURE OF TYMPANIC MEMBRANE, RECURRENCE NOT SPECIFIED: Primary | ICD-10-CM

## 2024-02-03 RX ORDER — AMOXICILLIN AND CLAVULANATE POTASSIUM 875; 125 MG/1; MG/1
1 TABLET, FILM COATED ORAL 2 TIMES DAILY
Qty: 20 TABLET | Refills: 0 | Status: SHIPPED | OUTPATIENT
Start: 2024-02-03 | End: 2024-02-13
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
## 2024-02-03 NOTE — ED PROVIDER NOTES
Patient Seen in: [facility]      History     Chief Complaint   Patient presents with    Ear Problem     Stated Complaint: Ear Problem    Subjective:   HPI    This is a 43-year-old female who presents with difficulty hearing to the left ear, clogged sensation.  Patient states that she has had the same thing intermittently over the last year.  Saw ENT in addition to audiologist last February and states everything was normal.  Patient states had persistent symptoms over the last year.  Did fly in December to [place] and states when she landed started having left ear bloody drainage.  Patient states since then she has not been able to hear fully out of her left ear.  No mastoid tenderness.    Objective:   Past Medical History:   Diagnosis Date    Patient denies medical problems     01/24/24              Past Surgical History:   Procedure Laterality Date    PATIENT DENIES ANY SURGICAL HISTORY      as of 01/24/2024                Social History     Socioeconomic History    Marital status: Single   Occupational History    Occupation:    Tobacco Use    Smoking status: Never    Smokeless tobacco: Never   Vaping Use    Vaping Use: Never used   Substance and Sexual Activity    Alcohol use: Yes     Alcohol/week: 0.0 standard drinks of alcohol     Comment: 1 beer weekly    Drug use: No    Sexual activity: Yes     Partners: Male   Other Topics Concern    Blood Transfusions No    Caffeine Concern Yes     Comment: coffee, 1 cup daily   Social History Narrative    No H/O abuse               Review of Systems   HENT:  Positive for ear discharge and ear pain.    All other systems reviewed and are negative.      Positive for stated complaint: Ear Problem  Other systems are as noted in HPI.  Constitutional and vital signs reviewed.      All other systems reviewed and negative except as noted above.    Physical Exam     ED Triage Vitals [02/03/24 1232]   /62   Pulse 63   Resp 20   Temp 98.1 °F (36.7 °C)    Temp src Temporal   SpO2 100 %   O2 Device None (Room air)       Current:/62   Pulse 63   Temp 98.1 °F (36.7 °C) (Temporal)   Resp 20   LMP 01/16/2024 (Exact Date)   SpO2 100%         Physical Exam  Vitals and nursing note reviewed.   Constitutional:       General: She is awake. She is not in acute distress.     Appearance: Normal appearance. She is not ill-appearing, toxic-appearing or diaphoretic.   HENT:      Head: Normocephalic and atraumatic.      Right Ear: Tympanic membrane, ear canal and external ear normal.      Left Ear: Ear canal and external ear normal. Tympanic membrane is erythematous and bulging.      Nose: Nose normal.      Mouth/Throat:      Lips: Pink.      Mouth: Mucous membranes are moist.      Pharynx: Oropharynx is clear. Uvula midline. No pharyngeal swelling or posterior oropharyngeal erythema.      Tonsils: No tonsillar exudate.   Eyes:      General: Lids are normal.      Extraocular Movements: Extraocular movements intact.      Conjunctiva/sclera: Conjunctivae normal.      Pupils: Pupils are equal, round, and reactive to light.   Cardiovascular:      Rate and Rhythm: Normal rate and regular rhythm.      Pulses: Normal pulses.      Heart sounds: Normal heart sounds.   Pulmonary:      Effort: Pulmonary effort is normal.      Breath sounds: Normal breath sounds.   Skin:     General: Skin is warm and dry.      Capillary Refill: Capillary refill takes less than 2 seconds.   Neurological:      General: No focal deficit present.      Mental Status: She is alert and oriented to person, place, and time.   Psychiatric:         Mood and Affect: Mood normal.         Behavior: Behavior normal. Behavior is cooperative.         Thought Content: Thought content normal.         Judgment: Judgment normal.         ED Course   Labs Reviewed - No data to display       MDM         Medical Decision Making  Patient is well-appearing on exam, nontoxic in appearance, exam as noted above.  Differential  diagnoses including but not limited to otitis media, otitis externa, middle ear effusion.  Examination and symptoms consistent with otitis media.  Will treat with Augmentin which she has tolerated well in the past.  Close follow-up with PCP was recommended.  Patient verbalized plan of care and stated understanding    Problems Addressed:  Acute suppurative otitis media of left ear without spontaneous rupture of tympanic membrane, recurrence not specified: acute illness or injury    Risk  OTC drugs.  Prescription drug management.        Disposition and Plan     Clinical Impression:  1. Acute suppurative otitis media of left ear without spontaneous rupture of tympanic membrane, recurrence not specified         Disposition:  Discharge  2/3/2024  1:02 pm    Follow-up:  Stephon De Anda,   [address] [facility] [address]  [phone]          Colton Reyes MD  [address] [place] [id number]  [phone]                Medications Prescribed:  Discharge Medication List as of 2/3/2024  1:04 PM

## 2024-02-07 ENCOUNTER — LAB ENCOUNTER (OUTPATIENT)
Dept: LAB | Facility: HOSPITAL | Age: 44
End: 2024-02-07
Attending: OBSTETRICS & GYNECOLOGY
Payer: COMMERCIAL

## 2024-02-07 DIAGNOSIS — Z91.89 AT RISK FOR FERTILITY PROBLEMS: ICD-10-CM

## 2024-02-07 LAB — FSH SERPL-ACNC: 3.3 MIU/ML

## 2024-02-07 PROCEDURE — 83001 ASSAY OF GONADOTROPIN (FSH): CPT

## 2024-02-07 PROCEDURE — 83520 IMMUNOASSAY QUANT NOS NONAB: CPT

## 2024-02-07 PROCEDURE — 36415 COLL VENOUS BLD VENIPUNCTURE: CPT

## 2024-02-11 LAB — MULLERIAN AMH: 2.26 NG/ML

## 2024-02-14 ENCOUNTER — OFFICE VISIT (OUTPATIENT)
Dept: OBGYN CLINIC | Facility: CLINIC | Age: 44
End: 2024-02-14
Payer: COMMERCIAL

## 2024-02-14 ENCOUNTER — LAB ENCOUNTER (OUTPATIENT)
Dept: LAB | Facility: REFERENCE LAB | Age: 44
End: 2024-02-14
Attending: FAMILY MEDICINE
Payer: COMMERCIAL

## 2024-02-14 VITALS
BODY MASS INDEX: 35 KG/M2 | DIASTOLIC BLOOD PRESSURE: 52 MMHG | WEIGHT: 205 LBS | SYSTOLIC BLOOD PRESSURE: 114 MMHG | HEIGHT: 64 IN

## 2024-02-14 DIAGNOSIS — N97.9 FEMALE INFERTILITY: Primary | ICD-10-CM

## 2024-02-14 DIAGNOSIS — N97.9 FEMALE INFERTILITY: ICD-10-CM

## 2024-02-14 LAB
ANTIBODY SCREEN: NEGATIVE
BASOPHILS # BLD AUTO: 0.03 X10(3) UL (ref 0–0.2)
BASOPHILS NFR BLD AUTO: 0.6 %
DEPRECATED RDW RBC AUTO: 42.5 FL (ref 35.1–46.3)
EOSINOPHIL # BLD AUTO: 0.27 X10(3) UL (ref 0–0.7)
EOSINOPHIL NFR BLD AUTO: 5.7 %
ERYTHROCYTE [DISTWIDTH] IN BLOOD BY AUTOMATED COUNT: 12.4 % (ref 11–15)
EST. AVERAGE GLUCOSE BLD GHB EST-MCNC: 105 MG/DL (ref 68–126)
HBA1C MFR BLD: 5.3 % (ref ?–5.7)
HBV SURFACE AG SER-ACNC: 0.32 [IU]/L
HBV SURFACE AG SERPL QL IA: NONREACTIVE
HCT VFR BLD AUTO: 38.4 %
HCV AB SERPL QL IA: NONREACTIVE
HGB BLD-MCNC: 13.1 G/DL
IMM GRANULOCYTES # BLD AUTO: 0.01 X10(3) UL (ref 0–1)
IMM GRANULOCYTES NFR BLD: 0.2 %
LYMPHOCYTES # BLD AUTO: 2.09 X10(3) UL (ref 1–4)
LYMPHOCYTES NFR BLD AUTO: 43.8 %
MCH RBC QN AUTO: 31.5 PG (ref 26–34)
MCHC RBC AUTO-ENTMCNC: 34.1 G/DL (ref 31–37)
MCV RBC AUTO: 92.3 FL
MONOCYTES # BLD AUTO: 0.33 X10(3) UL (ref 0.1–1)
MONOCYTES NFR BLD AUTO: 6.9 %
NEUTROPHILS # BLD AUTO: 2.04 X10 (3) UL (ref 1.5–7.7)
NEUTROPHILS # BLD AUTO: 2.04 X10(3) UL (ref 1.5–7.7)
NEUTROPHILS NFR BLD AUTO: 42.8 %
PLATELET # BLD AUTO: 188 10(3)UL (ref 150–450)
PROLACTIN SERPL-MCNC: 10.3 NG/ML
RBC # BLD AUTO: 4.16 X10(6)UL
RH BLOOD TYPE: POSITIVE
RUBV IGG SER QL: POSITIVE
RUBV IGG SER-ACNC: 238.6 IU/ML (ref 10–?)
T PALLIDUM AB SER QL IA: NONREACTIVE
T3FREE SERPL-MCNC: 2.8 PG/ML (ref 2.4–4.2)
T4 FREE SERPL-MCNC: 1.1 NG/DL (ref 0.8–1.7)
TSI SER-ACNC: 0.47 MIU/ML (ref 0.55–4.78)
WBC # BLD AUTO: 4.8 X10(3) UL (ref 4–11)

## 2024-02-14 PROCEDURE — 86803 HEPATITIS C AB TEST: CPT

## 2024-02-14 PROCEDURE — 83021 HEMOGLOBIN CHROMOTOGRAPHY: CPT

## 2024-02-14 PROCEDURE — 86762 RUBELLA ANTIBODY: CPT

## 2024-02-14 PROCEDURE — 84146 ASSAY OF PROLACTIN: CPT | Performed by: OBSTETRICS & GYNECOLOGY

## 2024-02-14 PROCEDURE — 87340 HEPATITIS B SURFACE AG IA: CPT

## 2024-02-14 PROCEDURE — 84443 ASSAY THYROID STIM HORMONE: CPT

## 2024-02-14 PROCEDURE — 83020 HEMOGLOBIN ELECTROPHORESIS: CPT

## 2024-02-14 PROCEDURE — 99213 OFFICE O/P EST LOW 20 MIN: CPT | Performed by: OBSTETRICS & GYNECOLOGY

## 2024-02-14 PROCEDURE — 86901 BLOOD TYPING SEROLOGIC RH(D): CPT

## 2024-02-14 PROCEDURE — 87086 URINE CULTURE/COLONY COUNT: CPT

## 2024-02-14 PROCEDURE — 86900 BLOOD TYPING SEROLOGIC ABO: CPT

## 2024-02-14 PROCEDURE — 87147 CULTURE TYPE IMMUNOLOGIC: CPT

## 2024-02-14 PROCEDURE — 83036 HEMOGLOBIN GLYCOSYLATED A1C: CPT

## 2024-02-14 PROCEDURE — 84481 FREE ASSAY (FT-3): CPT

## 2024-02-14 PROCEDURE — 86850 RBC ANTIBODY SCREEN: CPT

## 2024-02-14 PROCEDURE — 87389 HIV-1 AG W/HIV-1&-2 AB AG IA: CPT

## 2024-02-14 PROCEDURE — 86780 TREPONEMA PALLIDUM: CPT

## 2024-02-14 PROCEDURE — 85025 COMPLETE CBC W/AUTO DIFF WBC: CPT

## 2024-02-14 PROCEDURE — 36415 COLL VENOUS BLD VENIPUNCTURE: CPT

## 2024-02-14 PROCEDURE — 84439 ASSAY OF FREE THYROXINE: CPT

## 2024-02-14 NOTE — PATIENT INSTRUCTIONS
1.) Do lab work including carrier screen  2.) Partner to get semen analysis  3.) schedule Femvue to evaluate uterus and fallopian tubes  4.) see Reproductive Endocrinologist.

## 2024-02-14 NOTE — PROGRESS NOTES
CC: Patient is here for fertility evaluation. Patient of Dr Joe    HPI: Patient is a 43 year old  for above.     She has had unprotected sex for 1 year. Partner age 38, had previous child who passed away due to birth defects, premature. He had normal SA 4 years ago.     She previous was  and had an ex and did not get pregnant.     Menses: once a month, no skipped periods, no heavy bleeding. No hx of fibroids. No excessive pain with pain.   Patient's last menstrual period was 2024.    OB History    Para Term  AB Living   0 0 0 0 0 0   SAB IAB Ectopic Multiple Live Births   0 0 0 0 0       GYN hx:       LPS:  + hx of chlamydia in HS      Past Surgical History:   Procedure Laterality Date    PATIENT DENIES ANY SURGICAL HISTORY      as of 2024     No Known Allergies  Family History   Problem Relation Age of Onset    Hypertension Mother     Other (brain aneurysm) Father 55    Hypertension Father     No Known Problems Brother     No Known Problems Brother     No Known Problems Brother     Breast Cancer Maternal Aunt 70    Ovarian Cancer Neg     Uterine Cancer Neg     Colon Cancer Neg      Social History     Socioeconomic History    Marital status: Single     Spouse name: Not on file    Number of children: Not on file    Years of education: Not on file    Highest education level: Not on file   Occupational History    Occupation:      Comment: @ [employer]   Tobacco Use    Smoking status: Never    Smokeless tobacco: Never   Vaping Use    Vaping Use: Never used   Substance and Sexual Activity    Alcohol use: Yes     Alcohol/week: 0.0 standard drinks of alcohol     Comment: 1 beer weekly    Drug use: No    Sexual activity: Yes     Partners: Male   Other Topics Concern     Service Not Asked    Blood Transfusions No    Caffeine Concern Yes     Comment: coffee, 1 cup daily    Occupational Exposure Not Asked    Hobby Hazards Not Asked    Sleep Concern Not Asked     Stress Concern Not Asked    Weight Concern Not Asked    Special Diet Not Asked    Back Care Not Asked    Exercise Not Asked    Bike Helmet Not Asked    Seat Belt Not Asked    Self-Exams Not Asked   Social History Narrative    No H/O abuse     Feels safe    Lives alone     Social Determinants of Health     Financial Resource Strain: Not on file   Food Insecurity: Not on file   Transportation Needs: Not on file   Physical Activity: Not on file   Stress: Not on file   Social Connections: Not on file   Housing Stability: Not on file       Medications reviewed. See active list.     /52   Ht 64\"   Wt 205 lb (93 kg)   LMP 02/09/2024   BMI 35.19 kg/m²       Exam:   GENERAL: well developed, well nourished, in no apparent distress    Component      Latest Ref Rng 2/7/2024   FSH      No established range for female sex mIU/mL 3.3    [facility] AMH      ng/mL 2.26          A/P: Patient is 43 year old female     1. Female infertility  - REPRODUCTIVE ENDOCRINOLOGY - EXTERNAL  - TSH W Reflex To Free T4; Future  - Prolactin  - CBC W Differential W Platelet; Future  - Rubella, IGG; Future  - Antibody Screen; Future  - T PALLIDUM SCREENING CASCADE; Future  - Hepatitis B Surface Antigen; Future  - Blood Type, ABO And Rh D; Future  - Urine Culture, Routine; Future  - HIV AG AB Combo; Future  - HCV Antibody; Future  - Hemoglobin Electrophoresis w/Rflx Alpha,beta Chain; Future  - Hemoglobin A1C; Future  - Sonohysterogram GYNE Only [[id number]]; Future    Check above   Partner referred for SA.   DW pt given age greater than 35 and 1 year of unprotected sex, she would be best served by [facility] and likely require IVF. DW pt the procedure.   Recommend use ovulation predictor kits in the meantime.   Will do FemVue tomorrow.     Becki Hammond MD

## 2024-02-15 ENCOUNTER — OFFICE VISIT (OUTPATIENT)
Dept: OBGYN CLINIC | Facility: CLINIC | Age: 44
End: 2024-02-15
Payer: COMMERCIAL

## 2024-02-15 VITALS
DIASTOLIC BLOOD PRESSURE: 68 MMHG | WEIGHT: 205 LBS | HEIGHT: 64 IN | SYSTOLIC BLOOD PRESSURE: 118 MMHG | BODY MASS INDEX: 35 KG/M2

## 2024-02-15 DIAGNOSIS — N97.9 FEMALE INFERTILITY: Primary | ICD-10-CM

## 2024-02-15 LAB
CONTROL LINE PRESENT WITH A CLEAR BACKGROUND (YES/NO): YES YES/NO
KIT LOT #: NORMAL NUMERIC
PREGNANCY TEST, URINE: NEGATIVE

## 2024-02-16 NOTE — PROGRESS NOTES
Here for Femvue for unexplained infertility. DW pt the procedure and risk including bleeding, infection. Dw pt to call if worsening LAP, fever, chills, nausea, vomiting      Femvue performed sterilel.     Transvaginal images taken.  Saline infused hysterosonogram and Femvue performed.  Double endometrial wall combined measurement is 5mm- no abnormalities seen within endo.  Bilateral Fallopian tubes appear patent

## 2024-02-21 LAB
HGB A2 MFR BLD: 2.6 % (ref 1.5–3.5)
HGB PNL BLD ELPH: 97.4 % (ref 95.5–100)

## 2024-02-26 ENCOUNTER — HOSPITAL ENCOUNTER (OUTPATIENT)
Age: 44
Discharge: HOME | End: 2024-02-26
Payer: COMMERCIAL

## 2024-02-26 VITALS
SYSTOLIC BLOOD PRESSURE: 134 MMHG | DIASTOLIC BLOOD PRESSURE: 68 MMHG | OXYGEN SATURATION: 100 % | HEART RATE: 70 BPM | RESPIRATION RATE: 20 BRPM | TEMPERATURE: 98 F

## 2024-02-26 DIAGNOSIS — S69.92XA INJURY OF LEFT THUMBNAIL, INITIAL ENCOUNTER: Primary | ICD-10-CM

## 2024-02-26 PROCEDURE — A4570 SPLINT: HCPCS | Performed by: NURSE PRACTITIONER

## 2024-02-26 PROCEDURE — 99213 OFFICE O/P EST LOW 20 MIN: CPT | Performed by: NURSE PRACTITIONER

## 2024-02-26 NOTE — ED INITIAL ASSESSMENT (HPI)
Pt's left thumb nail partially came off after hitting the car door a week ago and is connected to fake nail on top; no signs of infection

## 2024-02-26 NOTE — ED PROVIDER NOTES
Patient Seen in: [facility]      History     Chief Complaint   Patient presents with    Finger Injury     Stated Complaint: Arm or Hand Injury - Nail partially hanging form the nail bed due to injury wit*    Subjective:   HPI  Patient is a 43-year-old female who presents to immediate care center with concern for injury to her left thumbnail.  1 week ago she was getting into a car and jammed of the thumbnail on the car door.  She has had mild pain since that time.  She recognizes that the nail is loose against the matrix, causing her presentation here today.  She has had no motor or sensory deficit.  She been cleaning it at home with hydrogen peroxide.  There has been no redness or drainage.          Objective:   Past Medical History:   Diagnosis Date    Patient denies medical problems     01/24/24              Past Surgical History:   Procedure Laterality Date    PATIENT DENIES ANY SURGICAL HISTORY      as of 01/24/2024                Social History     Socioeconomic History    Marital status: Single   Occupational History    Occupation:      Comment: @ [employer]   Tobacco Use    Smoking status: Never    Smokeless tobacco: Never   Vaping Use    Vaping Use: Never used   Substance and Sexual Activity    Alcohol use: Yes     Alcohol/week: 0.0 standard drinks of alcohol     Comment: 1 beer weekly    Drug use: No    Sexual activity: Yes     Partners: Male   Other Topics Concern    Blood Transfusions No    Caffeine Concern Yes     Comment: coffee, 1 cup daily   Social History Narrative    No H/O abuse     Feels safe    Lives alone              Review of Systems   Constitutional: Negative.    Musculoskeletal:  Negative for arthralgias.   Skin:  Negative for wound.   Neurological:  Negative for weakness and numbness.       Positive for stated complaint: Arm or Hand Injury - Nail partially hanging form the nail bed due to injury wit*  Other systems are as noted in HPI.  Constitutional and  vital signs reviewed.      All other systems reviewed and negative except as noted above.    Physical Exam     ED Triage Vitals [02/26/24 0833]   /68   Pulse 70   Resp 20   Temp 97.5 °F (36.4 °C)   Temp src Temporal   SpO2 100 %   O2 Device None (Room air)       Current:/68   Pulse 70   Temp 97.5 °F (36.4 °C) (Temporal)   Resp 20   LMP 02/09/2024   SpO2 100%         Physical Exam  Vitals and nursing note reviewed.   Constitutional:       General: She is not in acute distress.  Cardiovascular:      Pulses: Normal pulses.   Musculoskeletal:      Left hand: Tenderness present. No swelling, deformity, lacerations or bony tenderness. Normal range of motion. Normal sensation. Normal capillary refill.      Comments: The left thumbnail is intact within the margins of the paronychia bilaterally as well as the eponychium.  The nail is loose: It does appear to be detached at the distal half of the nail from the matrix; however, the proximal half of the nail does still appear attached to the matrix.  Skin surrounding is nonerythematous, not warm, not swollen, no drainage.   Skin:     General: Skin is warm and dry.      Findings: No erythema.   Neurological:      Mental Status: She is alert.   Psychiatric:         Behavior: Behavior normal.               ED Course   Labs Reviewed - No data to display  We discussed treatment options with patient in detail.  There does not appear to be any subungual hematoma (although she is wearing acrylic nail making this somewhat difficult to assess); there does not appear to be any injury to the nail matrix.  With the nail being intact and in place within the margins of the paronychia and the eponychial him, she was advised that there is not an indication at this time to avulse the nail.  It would be best to simply protect the nail with splinting to keep it from pulling on anything and becoming fully detached.  She was fitted with a finger splint here today to protect the  nail.                 MDM      Patient will continue to clean twice daily with soap and water apply Neosporin.  She was encouraged to discontinue her use of hydrogen peroxide.  She will wear the splint to protect the nail while it continues to grow.  She was informed that at some point as the nail grows the detached area will likely fall off.  She states understanding agrees with plan.                                 Medical Decision Making  Differential diagnoses considered today include, but are not exclusive of: Nail avulsion; laceration of the nailbed; abdominal hematoma; infection.    Problems Addressed:  Injury of left thumbnail, initial encounter: self-limited or minor problem    Risk  OTC drugs.        Disposition and Plan     Clinical Impression:  1. Injury of left thumbnail, initial encounter         Disposition:  Discharge  2/26/2024  9:02 am    Follow-up:  Stephon De Anda, DO  [address] [facility] [address]  [phone]      As needed          Medications Prescribed:  Discharge Medication List as of 2/26/2024  9:03 AM

## 2024-03-04 ENCOUNTER — TELEPHONE (OUTPATIENT)
Dept: OBGYN CLINIC | Facility: CLINIC | Age: 44
End: 2024-03-04

## 2024-03-04 NOTE — PATIENT INSTRUCTIONS
1000mg Tylenol and 600mg Ibuprofen every 6 hours (with food) as needed for pain and inflammation  Can be combined with Methocarbamol muscle relaxer Detail Level: Detailed Detail Level: Simple Detail Level: Generalized Detail Level: Zone

## 2024-03-04 NOTE — TELEPHONE ENCOUNTER
Rn spoke with pt, verified name and . 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 RN told pt that her partner should be tested. RN provided pt with number for [employer] genetic counsel[facility] and emailed report to pt. RN told pt to have her partner call the office to set up testing. Pt verbalized understanding and agreed with POC.

## 2024-03-29 ENCOUNTER — TELEPHONE (OUTPATIENT)
Dept: OBGYN CLINIC | Facility: CLINIC | Age: 44
End: 2024-03-29

## 2024-05-06 ENCOUNTER — HOSPITAL ENCOUNTER (OUTPATIENT)
Dept: MAMMOGRAPHY | Age: 44
Discharge: HOME | End: 2024-05-06
Attending: OBSTETRICS & GYNECOLOGY
Payer: COMMERCIAL

## 2024-05-06 DIAGNOSIS — Z12.31 BREAST CANCER SCREENING BY MAMMOGRAM: ICD-10-CM

## 2024-05-06 PROCEDURE — 77063 BREAST TOMOSYNTHESIS BI: CPT | Performed by: OBSTETRICS & GYNECOLOGY

## 2024-05-06 PROCEDURE — 77067 SCR MAMMO BI INCL CAD: CPT | Performed by: OBSTETRICS & GYNECOLOGY

## 2024-05-15 ENCOUNTER — HOSPITAL ENCOUNTER (OUTPATIENT)
Dept: ULTRASOUND IMAGING | Facility: HOSPITAL | Age: 44
Discharge: HOME | End: 2024-05-15
Attending: OBSTETRICS & GYNECOLOGY

## 2024-05-15 ENCOUNTER — HOSPITAL ENCOUNTER (OUTPATIENT)
Dept: MAMMOGRAPHY | Facility: HOSPITAL | Age: 44
Discharge: HOME | End: 2024-05-15
Attending: OBSTETRICS & GYNECOLOGY

## 2024-05-15 DIAGNOSIS — R92.8 ABNORMAL MAMMOGRAM: ICD-10-CM

## 2024-05-15 PROCEDURE — 77065 DX MAMMO INCL CAD UNI: CPT | Performed by: OBSTETRICS & GYNECOLOGY

## 2024-05-15 PROCEDURE — 77061 BREAST TOMOSYNTHESIS UNI: CPT | Performed by: OBSTETRICS & GYNECOLOGY

## 2024-05-15 PROCEDURE — 76642 ULTRASOUND BREAST LIMITED: CPT | Performed by: OBSTETRICS & GYNECOLOGY

## 2024-05-23 DIAGNOSIS — R92.8 ABNORMAL MAMMOGRAM: Primary | ICD-10-CM

## 2024-12-11 ENCOUNTER — TELEPHONE (OUTPATIENT)
Facility: CLINIC | Age: 44
End: 2024-12-11

## 2024-12-11 DIAGNOSIS — M25.562 LEFT KNEE PAIN, UNSPECIFIED CHRONICITY: Primary | ICD-10-CM
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
## 2024-12-11 NOTE — TELEPHONE ENCOUNTER
Spoke with patient and let her know to come in early to get x-rays taken as she has nothing recent. Location and directions to office given. X-ray order entered per protocol

## 2024-12-23 ENCOUNTER — OFFICE VISIT (OUTPATIENT)
Dept: ORTHOPEDICS CLINIC | Facility: CLINIC | Age: 44
End: 2024-12-23
Payer: COMMERCIAL

## 2024-12-23 ENCOUNTER — HOSPITAL ENCOUNTER (OUTPATIENT)
Dept: GENERAL RADIOLOGY | Age: 44
Discharge: HOME | End: 2024-12-23
Attending: STUDENT IN AN ORGANIZED HEALTH CARE EDUCATION/TRAINING PROGRAM
Payer: COMMERCIAL

## 2024-12-23 VITALS — SYSTOLIC BLOOD PRESSURE: 110 MMHG | HEART RATE: 65 BPM | DIASTOLIC BLOOD PRESSURE: 59 MMHG

## 2024-12-23 DIAGNOSIS — M25.562 LEFT KNEE PAIN, UNSPECIFIED CHRONICITY: ICD-10-CM

## 2024-12-23 DIAGNOSIS — M22.42 DEGENERATION OF ARTICULAR CARTILAGE OF PATELLA, LEFT: Primary | ICD-10-CM

## 2024-12-23 PROCEDURE — 73564 X-RAY EXAM KNEE 4 OR MORE: CPT | Performed by: STUDENT IN AN ORGANIZED HEALTH CARE EDUCATION/TRAINING PROGRAM

## 2024-12-23 RX ORDER — METHYLPREDNISOLONE ACETATE 40 MG/ML
80 INJECTION, SUSPENSION INTRA-ARTICULAR; INTRALESIONAL; INTRAMUSCULAR; SOFT TISSUE ONCE
Status: COMPLETED | OUTPATIENT
Start: 2024-12-23 | End: 2024-12-23

## 2024-12-23 RX ADMIN — METHYLPREDNISOLONE ACETATE 80 MG: 40 INJECTION, SUSPENSION INTRA-ARTICULAR; INTRALESIONAL; INTRAMUSCULAR; SOFT TISSUE at 15:47:00

## 2024-12-23 NOTE — PROGRESS NOTES
Per  request was draw 1 syringe with 2 ml Lidocaine 2% ; 2 ml Marcaine 0.5% and 1 ml Depo-Medrol 40 mg for left  knee. Marcelle HEMPHILL MA
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
## 2024-12-26 ENCOUNTER — TELEMEDICINE (OUTPATIENT)
Dept: TELEHEALTH | Age: 44
End: 2024-12-26
Payer: COMMERCIAL

## 2024-12-26 ENCOUNTER — HOSPITAL ENCOUNTER (OUTPATIENT)
Age: 44
Discharge: HOME | End: 2024-12-26
Payer: COMMERCIAL

## 2024-12-26 VITALS
DIASTOLIC BLOOD PRESSURE: 54 MMHG | TEMPERATURE: 99 F | OXYGEN SATURATION: 100 % | HEART RATE: 66 BPM | RESPIRATION RATE: 16 BRPM | SYSTOLIC BLOOD PRESSURE: 118 MMHG

## 2024-12-26 DIAGNOSIS — J06.9 VIRAL URI WITH COUGH: Primary | ICD-10-CM

## 2024-12-26 DIAGNOSIS — B34.9 VIRAL ILLNESS: Primary | ICD-10-CM

## 2024-12-26 LAB
POCT INFLUENZA A: NEGATIVE
POCT INFLUENZA B: NEGATIVE
S PYO AG THROAT QL: NEGATIVE
SARS-COV-2 RNA RESP QL NAA+PROBE: NOT DETECTED

## 2024-12-26 PROCEDURE — 99214 OFFICE O/P EST MOD 30 MIN: CPT | Performed by: PHYSICIAN ASSISTANT

## 2024-12-26 PROCEDURE — 87502 INFLUENZA DNA AMP PROBE: CPT | Performed by: PHYSICIAN ASSISTANT

## 2024-12-26 PROCEDURE — U0002 COVID-19 LAB TEST NON-CDC: HCPCS | Performed by: PHYSICIAN ASSISTANT

## 2024-12-26 PROCEDURE — 87880 STREP A ASSAY W/OPTIC: CPT | Performed by: PHYSICIAN ASSISTANT

## 2024-12-26 RX ORDER — BENZONATATE 200 MG/1
200 CAPSULE ORAL 3 TIMES DAILY PRN
Qty: 20 CAPSULE | Refills: 0 | Status: SHIPPED | OUTPATIENT
Start: 2024-12-26

## 2024-12-26 RX ORDER — FLUTICASONE PROPIONATE 50 MCG
2 SPRAY, SUSPENSION (ML) NASAL DAILY
Qty: 16 G | Refills: 0 | Status: SHIPPED | OUTPATIENT
Start: 2024-12-26 | End: 2025-01-25

## 2024-12-26 RX ORDER — BENZOCAINE AND MENTHOL, UNSPECIFIED FORM 15; 2.3 MG/1; MG/1
1 LOZENGE ORAL AS NEEDED
Qty: 16 LOZENGE | Refills: 0 | Status: SHIPPED | OUTPATIENT
Start: 2024-12-26

## 2024-12-26 NOTE — DISCHARGE INSTRUCTIONS
Drink plenty of water and get plenty of rest   You may benefit from taking a decongestant (e.g. Sudafed or Mucinex)  You may benefit from taking a daily allergy medication (e.g. Zyrtec)  You may benefit from using a humidifier    Alternate Tylenol and Motrin every 3 hours for pain or fever > 100.4 degrees    Sleep with head elevated and avoid laying flat  Avoid having air blow on your face    Wash hands often  Disinfect your environment  Do not share utensils or drinks    Symptoms may take a few weeks to resolve  Follow up with your primary care provider

## 2024-12-26 NOTE — ED PROVIDER NOTES
Chief Complaint   Patient presents with    Sore Throat       History obtained from: patient   services not used     HPI:     Mara Garcia is a 44 year old female who presents with general illness symptoms x 2 days.  Patient endorses sore throat, cough, congestion, and postnasal drip.  Patient continues to tolerate oral intake.  Patient has been taking vitamins, zinc, and Mucinex without significant relief.  Denies fevers, facial or neck swelling, drooling, chest pain, shortness of breath, wheezing, neck pain or stiffness, abdominal pain, nausea, vomiting, diarrhea.    PMH  Past Medical History:    Patient denies medical problems    01/24/24       PFSH    PFS asessment screens reviewed and agree.  Nurses notes reviewed I agree with documentation.    Family History   Problem Relation Age of Onset    Hypertension Mother     Other (brain aneurysm) Father 55    Hypertension Father     No Known Problems Brother     No Known Problems Brother     No Known Problems Brother     Breast Cancer Maternal Aunt 70    Ovarian Cancer Neg     Uterine Cancer Neg     Colon Cancer Neg      Family history reviewed with patient/caregiver and is not pertinent to presenting problem.  Social History     Socioeconomic History    Marital status: Single     Spouse name: Not on file    Number of children: Not on file    Years of education: Not on file    Highest education level: Not on file   Occupational History    Occupation:      Comment: @ [employer]   Tobacco Use    Smoking status: Never    Smokeless tobacco: Never   Vaping Use    Vaping status: Never Used   Substance and Sexual Activity    Alcohol use: Yes     Alcohol/week: 0.0 standard drinks of alcohol     Comment: 1 beer weekly    Drug use: No    Sexual activity: Yes     Partners: Male   Other Topics Concern     Service Not Asked    Blood Transfusions No    Caffeine Concern Yes     Comment: coffee, 1 cup daily    Occupational Exposure Not Asked     Hobby Hazards Not Asked    Sleep Concern Not Asked    Stress Concern Not Asked    Weight Concern Not Asked    Special Diet Not Asked    Back Care Not Asked    Exercise Not Asked    Bike Helmet Not Asked    Seat Belt Not Asked    Self-Exams Not Asked   Social History Narrative    No H/O abuse     Feels safe    Lives alone     Social Drivers of Health     Financial Resource Strain: Not on file   Food Insecurity: Not on file   Transportation Needs: Not on file   Physical Activity: Not on file   Stress: Not on file   Social Connections: Not on file   Housing Stability: Not on file         ROS:   Positive for stated complaint: Sore throat, cough, congestion, postnasal drip  All other systems reviewed and negative except as noted above.  Constitutional and Vital Signs Reviewed.    Physical Exam:     Findings:    /54   Pulse 66   Temp 98.7 °F (37.1 °C) (Oral)   Resp 16   LMP 12/06/2024 (Approximate)   SpO2 100%   GENERAL: well developed, no acute distress, non-toxic appearing   SKIN: good skin turgor, no obvious rashes  HEAD: normocephalic, atraumatic  EYES: sclera non-icteric bilaterally, conjunctiva clear bilaterally  EARS: canals clear bilaterally, TMs clear bilaterally  NOSE: nasal congestion  OROPHARYNX: MMM, pharynx mildly erythematous, no exudates or swelling, uvula midline, no tongue elevation, maintaining airway and secretions  NECK: supple, no lymphadenopathy, no nuchal rigidity, no trismus, no edema, phonation normal    CARDIO: RRR, normal heart sounds   LUNGS: clear to auscultation bilaterally, no increased WOB, no rales, rhonchi, or wheezes  EXTREMITIES: no cyanosis or edema, MARTIN without difficulty  NEURO: no focal deficits  PSYCH: alert and oriented x3, answering questions appropriately, mood appropriate    MDM/Assessment/Plan:   Orders for this encounter:    Orders Placed This Encounter    POCT Rapid Strep    POCT Flu Test     Order Specific Question:   Release to patient     Answer:   Immediate     Rapid SARS-CoV-2 by PCR     Order Specific Question:   Release to patient     Answer:   Immediate    POCT Rapid Strep    Benzocaine-Menthol (CEPACOL) 15-2.3 MG Mouth/Throat Lozenge     Sig: Use as directed 1 lozenge in the mouth or throat as needed (sore throat).     Dispense:  16 lozenge     Refill:  0    benzonatate 200 MG Oral Cap     Sig: Take 1 capsule (200 mg total) by mouth 3 (three) times daily as needed for cough.     Dispense:  20 capsule     Refill:  0    fluticasone propionate 50 MCG/ACT Nasal Suspension     Si sprays by Nasal route daily.     Dispense:  16 g     Refill:  0       Labs performed this visit:  Recent Results (from the past 10 hours)   POCT Rapid Strep    Collection Time: 24  3:18 PM   Result Value Ref Range    POCT Rapid Strep Negative Negative   POCT Flu Test    Collection Time: 24  3:24 PM    Specimen: Nares; Other   Result Value Ref Range    POCT INFLUENZA A Negative Negative    POCT INFLUENZA B Negative Negative   Rapid SARS-CoV-2 by PCR    Collection Time: 24  3:24 PM    Specimen: Nares; Other   Result Value Ref Range    Rapid SARS-CoV-2 by PCR Not Detected Not Detected       Imaging performed this visit:  No orders to display       Medical Decision Making  DDx includes viral URI versus covid versus flu versus strep pharyngitis versus other. Patient is overall very well-appearing with stable vitals and tolerating oral intake. No hypoxia or signs of respiratory distress.  No significant tonsillar swelling or signs of PTA.  Covid, flu, and strep tests negative. Discussed supportive care for suspected viral illness including rest, increased fluid intake, and OTC Tylenol/Motrin as needed for pain or fevers.  Rx Cepacol throat lozenges, Tessalon, and Flonase nasal spray for symptomatic management.  Discussed infection control.  Instructed patient to go directly to nearest ER with any worsening or concerning symptoms.  Follow-up with PCP.    Amount and/or Complexity  of Data Reviewed  Labs: ordered.    Risk  OTC drugs.  Prescription drug management.          Diagnosis:    ICD-10-CM    1. Viral illness  B34.9           All results reviewed and discussed with patient/patient's family. Patient/patient's family verbalize excellent understanding of instructions and feels comfortable with plan. All of patient's/patient's family's questions were addressed.   See AVS for detailed discharge instructions for your condition today.    Follow Up with:  Stephon De Anda DO  [address] [place] [id number]  [phone]            Note: This document was dictated using Dragon medical dictation software.  Proofreading was performed to the best of my ability, but errors may be present.    Marie Spain PA-C

## 2024-12-26 NOTE — ED INITIAL ASSESSMENT (HPI)
Pt came in due to sore throat, congestion, and cough for the past 2 days. Pt has easy non labored respirations.

## 2024-12-26 NOTE — PROGRESS NOTES
Virtual/Telephone Check-In    Mara Garcia verbally consents to a Virtual/Telephone Check-In service on 12/26/24.  Patient has been referred to the [facility] website at [email or web address] to review the yearly Consent to Treat document.  Patient understands and accepts financial responsibility for any deductible, co-insurance and/or co-pays associated with this service.       Telehealth Verbal Consent   I conducted a telehealth visit with Mara Garcia today, 12/26/24, which was completed using two-way, real-time interactive audio and video communication. This has been done in good matthew to provide continuity of care in the best interest of the provider-patient relationship, due to the COVID - public health crisis/national emergency where restrictions of face-to-face office visits are ongoing. Every conscious effort was taken to allow for sufficient and adequate time to complete the visit.  The patient was made aware of the limitations of the telehealth visit, including treatment limitations as no physical exam could be performed.  The patient was advised to call 911 or to go to the ER in case there was an emergency.  The patient was also advised of the potential privacy & security concerns related to the telehealth platform.   The patient was made aware of where to find [facility]'s notice of privacy practices, telehealth consent form and other related consent forms and documents.  which are located on the [facility] website. The patient verbally agreed to telehealth consent form, related consents and the risks discussed.    Lastly, the patient confirmed that they were in [place].   Included in this visit, time may have been spent reviewing labs, medications, radiology tests and decision making. Appropriate medical decision-making and tests are ordered as detailed in the plan of care above.  Coding/billing information is submitted for this visit based on complexity of care and/or time spent for the visit.    CHIEF  COMPLAINT:  Chief Complaint   Patient presents with    Sore Throat     Post nasal drip       HPI:  Mara Garcia is a 44 year old female who presents for a video visit.  Patient reports post nasal drip, sore throat, nasal congestion, headache, chills, tactile temp for 2 days. States coughing is worse when laying down.  Patient denies body aches or breathing issues. States she has hx of sinus infections in the past. Patient has tried Mucinex, Flonase and Ibuprofen for symptoms, which has not seemed to help.     Current Outpatient Medications   Medication Sig Dispense Refill    fluticasone propionate 50 MCG/ACT Nasal Suspension 2 sprays by Each Nare route daily. 1 each 0     Past Medical History:    Patient denies medical problems    01/24/24     Past Surgical History:   Procedure Laterality Date    Patient denies any surgical history      as of 01/24/2024       Social History     Socioeconomic History    Marital status: Single   Occupational History    Occupation:      Comment: @ [employer]   Tobacco Use    Smoking status: Never    Smokeless tobacco: Never   Vaping Use    Vaping status: Never Used   Substance and Sexual Activity    Alcohol use: Yes     Alcohol/week: 0.0 standard drinks of alcohol     Comment: 1 beer weekly    Drug use: No    Sexual activity: Yes     Partners: Male   Other Topics Concern    Blood Transfusions No    Caffeine Concern Yes     Comment: coffee, 1 cup daily   Social History Narrative    No H/O abuse     Feels safe    Lives alone        REVIEW OF SYSTEMS:  GENERAL: decreased appetite  SKIN: no rashes or abnormal skin lesions  HEENT: See HPI  LUNGS: denies shortness of breath or wheezing, See HPI  CARDIOVASCULAR: denies chest pain or palpitations   GI: denies N/V/C or abdominal pain  NEURO: + headaches    EXAM:  General: Alert, Ill-appearing/sounding, and In no acute distress  Respiratory:   Speaking in full sentences comfortably  Normal work of breathing  Coughing during  visit   Head: Normocephalic  Nose: No obvious nasal discharge.  Skin: No obvious rashes or lesions from what observed.     No results found for this or any previous visit (from the past 24 hours).    ASSESSMENT AND PLAN:  Mara Garcia is a 44 year old female who presents with symptoms that are consistent with    ASSESSMENT:   Encounter Diagnosis   Name Primary?    Viral URI with cough Yes       PLAN: Discussed likely viral etiology based on s/s and duration. Discussed when secondary bacterial infections develops and s/s. If pt is concerned for bacterial infection then can go to IC for further eval for cough and ST. S/s do not seem c/w with ABRS at this time. Discussed supportive care measures.     Discussed locations and hours of IC clinics near pt. She will go today.       There are no Patient Instructions on file for this visit.    The patient indicates understanding of these issues and agrees to the plan.      Mara Garcia understands video visit evaluation is not a substitute for face-to-face examination or emergency care. Patient advised to go to ER or call 911 for worsening symptoms or acute distress.

## 2024-12-27 ENCOUNTER — PATIENT MESSAGE (OUTPATIENT)
Dept: FAMILY MEDICINE CLINIC | Facility: CLINIC | Age: 44
End: 2024-12-27

## 2025-01-09 ENCOUNTER — TELEMEDICINE (OUTPATIENT)
Dept: TELEHEALTH | Age: 45
End: 2025-01-09
Payer: COMMERCIAL

## 2025-01-09 DIAGNOSIS — J01.90 ACUTE RHINOSINUSITIS: Primary | ICD-10-CM

## 2025-01-09 PROCEDURE — 98004 SYNCH AUDIO-VIDEO EST SF 10: CPT | Performed by: PHYSICIAN ASSISTANT

## 2025-01-09 NOTE — PATIENT INSTRUCTIONS
Take antibiotic with a meal twice daily  Eat yogurt daily and/or take a probiotic for next 2 weeks  Use nasal spray two sprays each nostril for one week, then one spray each nostril for one week, then you may stop

## 2025-01-09 NOTE — PROGRESS NOTES
Virtual/Telephone Check-In    Mara Garcia verbally consents to a Virtual/Telephone Check-In service on 01/09/25.  Patient has been referred to the [facility] website at [email or web address] to review the yearly Consent to Treat document.  Patient understands and accepts financial responsibility for any deductible, co-insurance and/or co-pays associated with this service.       Telehealth Verbal Consent   I conducted a telehealth visit with Mara Garcia today, 01/09/25, which was completed using two-way, real-time interactive audio and video communication. This has been done in good matthew to provide continuity of care in the best interest of the provider-patient relationship, due to the COVID - public health crisis/national emergency where restrictions of face-to-face office visits are ongoing. Every conscious effort was taken to allow for sufficient and adequate time to complete the visit.  The patient was made aware of the limitations of the telehealth visit, including treatment limitations as no physical exam could be performed.  The patient was advised to call 911 or to go to the ER in case there was an emergency.  The patient was also advised of the potential privacy & security concerns related to the telehealth platform.   The patient was made aware of where to find [facility]'s notice of privacy practices, telehealth consent form and other related consent forms and documents.  which are located on the [facility] website. The patient verbally agreed to telehealth consent form, related consents and the risks discussed.    Lastly, the patient confirmed that they were in [place].   Included in this visit, time may have been spent reviewing labs, medications, radiology tests and decision making. Appropriate medical decision-making and tests are ordered as detailed in the plan of care above.  Coding/billing information is submitted for this visit based on complexity of care and/or time spent for the visit.    CHIEF  COMPLAINT:     Chief Complaint   Patient presents with    Sinus Problem       HPI:   Mara Garcia is a 44 year old female who presents for a video visit.  Patient reports ongoing sinus/cold issues.  Lots of sinus and throat pain due to the post nasal drip.  Eating soup and warm liquids.  Taking 600 mg ibuprofen regularly, benzonatate, Flonase and sinus rinses.  Throat feels like she is swallowing glass for days.  She did VV at onset of illness on 12/26/24 then proceeded to IC same day.  She feels no better after 2 weeks of symptoms    Current Outpatient Medications   Medication Sig Dispense Refill    Benzocaine-Menthol (CEPACOL) 15-2.3 MG Mouth/Throat Lozenge Use as directed 1 lozenge in the mouth or throat as needed (sore throat). 16 lozenge 0    benzonatate 200 MG Oral Cap Take 1 capsule (200 mg total) by mouth 3 (three) times daily as needed for cough. 20 capsule 0    fluticasone propionate 50 MCG/ACT Nasal Suspension 2 sprays by Nasal route daily. 16 g 0    fluticasone propionate 50 MCG/ACT Nasal Suspension 2 sprays by Each Nare route daily. 1 each 0      Past Medical History:    Patient denies medical problems    01/24/24      Past Surgical History:   Procedure Laterality Date    Patient denies any surgical history      as of 01/24/2024         Social History     Socioeconomic History    Marital status: Single   Occupational History    Occupation:      Comment: @ [employer]   Tobacco Use    Smoking status: Never    Smokeless tobacco: Never   Vaping Use    Vaping status: Never Used   Substance and Sexual Activity    Alcohol use: Yes     Alcohol/week: 0.0 standard drinks of alcohol     Comment: 1 beer weekly    Drug use: No    Sexual activity: Yes     Partners: Male   Other Topics Concern    Blood Transfusions No    Caffeine Concern Yes     Comment: coffee, 1 cup daily   Social History Narrative    No H/O abuse     Feels safe    Lives alone         REVIEW OF SYSTEMS:   GENERAL: no fevers  SKIN:  no rashes or abnormal skin lesions  HEENT: See HPI  LUNGS: denies shortness of breath or wheezing, See HPI      EXAM:   General: Alert, Ill-appearing/sounding, and In no acute distress  Respiratory:   Speaking in full sentences comfortably  Normal work of breathing  No cough during visit  Head: Normocephalic  Nose: No obvious nasal discharge.  Skin: No obvious rashes or lesions from what observed.     No results found for this or any previous visit (from the past 24 hours).    ASSESSMENT AND PLAN:   Mara Garcia is a 44 year old female who presents with symptoms that are consistent with    ASSESSMENT:   Encounter Diagnosis   Name Primary?    Acute rhinosinusitis Yes       PLAN: Meds as below.  See patient Instructions    Meds & Refills for this Visit:  Requested Prescriptions     Signed Prescriptions Disp Refills    amoxicillin clavulanate 875-125 MG Oral Tab 14 tablet 0     Sig: Take 1 tablet by mouth 2 (two) times daily for 7 days.       Risks, benefits, and side effects of medication explained and discussed.    The patient indicates understanding of these issues and agrees to the plan.  The patient is asked to return if sx's persist or worsen.    Face to face time spent on Video Visit: 7:10  Total Time spent on visit including reviewing history, ordering labs/medication, patient examination and education: 14    Mara Garcia understands video visit evaluation is not a substitute for face-to-face examination or emergency care. Patient advised to go to ER or call 911 for worsening symptoms or acute distress.

## 2025-01-23 ENCOUNTER — MED REC SCAN ONLY (OUTPATIENT)
Dept: ORTHOPEDICS CLINIC | Facility: CLINIC | Age: 45
End: 2025-01-23

## 2025-05-09 ENCOUNTER — HOSPITAL ENCOUNTER (EMERGENCY)
Facility: HOSPITAL | Age: 45
Discharge: HOME | End: 2025-05-10
Attending: EMERGENCY MEDICINE
Payer: COMMERCIAL

## 2025-05-09 ENCOUNTER — APPOINTMENT (OUTPATIENT)
Dept: CT IMAGING | Facility: HOSPITAL | Age: 45
End: 2025-05-09
Attending: EMERGENCY MEDICINE
Payer: COMMERCIAL

## 2025-05-09 ENCOUNTER — APPOINTMENT (OUTPATIENT)
Dept: ULTRASOUND IMAGING | Facility: HOSPITAL | Age: 45
End: 2025-05-09
Attending: EMERGENCY MEDICINE
Payer: COMMERCIAL

## 2025-05-09 VITALS
OXYGEN SATURATION: 100 % | SYSTOLIC BLOOD PRESSURE: 128 MMHG | DIASTOLIC BLOOD PRESSURE: 70 MMHG | WEIGHT: 200 LBS | TEMPERATURE: 99 F | HEIGHT: 64 IN | RESPIRATION RATE: 19 BRPM | BODY MASS INDEX: 34.15 KG/M2 | HEART RATE: 80 BPM

## 2025-05-09 DIAGNOSIS — D25.9 UTERINE LEIOMYOMA, UNSPECIFIED LOCATION: ICD-10-CM

## 2025-05-09 DIAGNOSIS — N83.9 FALLOPIAN TUBE DISORDER: Primary | ICD-10-CM

## 2025-05-09 DIAGNOSIS — R10.84 ABDOMINAL PAIN, GENERALIZED: ICD-10-CM

## 2025-05-09 LAB
ALBUMIN SERPL-MCNC: 3.8 G/DL (ref 3.2–4.8)
ALBUMIN/GLOB SERPL: 1.5 {RATIO} (ref 1–2)
ALP LIVER SERPL-CCNC: 52 U/L (ref 37–98)
ALT SERPL-CCNC: 17 U/L (ref 10–49)
ANION GAP SERPL CALC-SCNC: 8 MMOL/L (ref 0–18)
AST SERPL-CCNC: 20 U/L (ref ?–34)
B-HCG UR QL: NEGATIVE
BASOPHILS # BLD AUTO: 0.02 X10(3) UL (ref 0–0.2)
BASOPHILS NFR BLD AUTO: 0.1 %
BILIRUB SERPL-MCNC: 0.6 MG/DL (ref 0.3–1.2)
BILIRUB UR QL: NEGATIVE
BUN BLD-MCNC: 9 MG/DL (ref 9–23)
BUN/CREAT SERPL: 8.7 (ref 10–20)
CALCIUM BLD-MCNC: 8.4 MG/DL (ref 8.7–10.4)
CHLORIDE SERPL-SCNC: 105 MMOL/L (ref 98–112)
CLARITY UR: CLEAR
CO2 SERPL-SCNC: 24 MMOL/L (ref 21–32)
CREAT BLD-MCNC: 1.04 MG/DL (ref 0.55–1.02)
DEPRECATED RDW RBC AUTO: 43 FL (ref 35.1–46.3)
EGFRCR SERPLBLD CKD-EPI 2021: 69 ML/MIN/1.73M2 (ref 60–?)
EOSINOPHIL # BLD AUTO: 0.06 X10(3) UL (ref 0–0.7)
EOSINOPHIL NFR BLD AUTO: 0.4 %
ERYTHROCYTE [DISTWIDTH] IN BLOOD BY AUTOMATED COUNT: 12.4 % (ref 11–15)
GLOBULIN PLAS-MCNC: 2.5 G/DL (ref 2–3.5)
GLUCOSE BLD-MCNC: 137 MG/DL (ref 70–99)
GLUCOSE UR-MCNC: NORMAL MG/DL
HCT VFR BLD AUTO: 36.7 % (ref 35–48)
HGB BLD-MCNC: 12.1 G/DL (ref 12–16)
IMM GRANULOCYTES # BLD AUTO: 0.07 X10(3) UL (ref 0–1)
IMM GRANULOCYTES NFR BLD: 0.5 %
LEUKOCYTE ESTERASE UR QL STRIP.AUTO: NEGATIVE
LIPASE SERPL-CCNC: 21 U/L (ref 12–53)
LYMPHOCYTES # BLD AUTO: 2.05 X10(3) UL (ref 1–4)
LYMPHOCYTES NFR BLD AUTO: 13.6 %
MCH RBC QN AUTO: 30.8 PG (ref 26–34)
MCHC RBC AUTO-ENTMCNC: 33 G/DL (ref 31–37)
MCV RBC AUTO: 93.4 FL (ref 80–100)
MONOCYTES # BLD AUTO: 0.64 X10(3) UL (ref 0.1–1)
MONOCYTES NFR BLD AUTO: 4.2 %
NEUTROPHILS # BLD AUTO: 12.23 X10 (3) UL (ref 1.5–7.7)
NEUTROPHILS # BLD AUTO: 12.23 X10(3) UL (ref 1.5–7.7)
NEUTROPHILS NFR BLD AUTO: 81.2 %
NITRITE UR QL STRIP.AUTO: NEGATIVE
OSMOLALITY SERPL CALC.SUM OF ELEC: 285 MOSM/KG (ref 275–295)
PH UR: 5.5 [PH] (ref 5–8)
PLATELET # BLD AUTO: 181 10(3)UL (ref 150–450)
POTASSIUM SERPL-SCNC: 3.3 MMOL/L (ref 3.5–5.1)
PROT SERPL-MCNC: 6.3 G/DL (ref 5.7–8.2)
PROT UR-MCNC: NEGATIVE MG/DL
RBC # BLD AUTO: 3.93 X10(6)UL (ref 3.8–5.3)
SODIUM SERPL-SCNC: 137 MMOL/L (ref 136–145)
SP GR UR STRIP: 1.01 (ref 1–1.03)
UROBILINOGEN UR STRIP-ACNC: NORMAL
WBC # BLD AUTO: 15.1 X10(3) UL (ref 4–11)

## 2025-05-09 PROCEDURE — 99285 EMERGENCY DEPT VISIT HI MDM: CPT

## 2025-05-09 PROCEDURE — 76830 TRANSVAGINAL US NON-OB: CPT | Performed by: EMERGENCY MEDICINE

## 2025-05-09 PROCEDURE — 85025 COMPLETE CBC W/AUTO DIFF WBC: CPT | Performed by: EMERGENCY MEDICINE

## 2025-05-09 PROCEDURE — 93975 VASCULAR STUDY: CPT | Performed by: EMERGENCY MEDICINE

## 2025-05-09 PROCEDURE — 83690 ASSAY OF LIPASE: CPT | Performed by: EMERGENCY MEDICINE

## 2025-05-09 PROCEDURE — 76856 US EXAM PELVIC COMPLETE: CPT | Performed by: EMERGENCY MEDICINE

## 2025-05-09 PROCEDURE — 81025 URINE PREGNANCY TEST: CPT

## 2025-05-09 PROCEDURE — 80053 COMPREHEN METABOLIC PANEL: CPT | Performed by: EMERGENCY MEDICINE

## 2025-05-09 PROCEDURE — 96374 THER/PROPH/DIAG INJ IV PUSH: CPT

## 2025-05-09 PROCEDURE — 81001 URINALYSIS AUTO W/SCOPE: CPT | Performed by: EMERGENCY MEDICINE

## 2025-05-09 PROCEDURE — 74177 CT ABD & PELVIS W/CONTRAST: CPT | Performed by: EMERGENCY MEDICINE

## 2025-05-09 RX ORDER — KETOROLAC TROMETHAMINE 30 MG/ML
30 INJECTION, SOLUTION INTRAMUSCULAR; INTRAVENOUS ONCE
Status: COMPLETED | OUTPATIENT
Start: 2025-05-09 | End: 2025-05-09

## 2025-05-10 LAB
BV BACTERIA DNA VAG QL NAA+PROBE: POSITIVE
C GLABRATA DNA VAG QL NAA+PROBE: NEGATIVE
C KRUSEI DNA VAG QL NAA+PROBE: NEGATIVE
CANDIDA DNA VAG QL NAA+PROBE: POSITIVE
T VAGINALIS DNA VAG QL NAA+PROBE: NEGATIVE

## 2025-05-10 PROCEDURE — 87661 TRICHOMONAS VAGINALIS AMPLIF: CPT | Performed by: EMERGENCY MEDICINE

## 2025-05-10 PROCEDURE — 81514 NFCT DS BV&VAGINITIS DNA ALG: CPT | Performed by: EMERGENCY MEDICINE

## 2025-05-10 PROCEDURE — 87591 N.GONORRHOEAE DNA AMP PROB: CPT | Performed by: EMERGENCY MEDICINE

## 2025-05-10 PROCEDURE — 87491 CHLMYD TRACH DNA AMP PROBE: CPT | Performed by: EMERGENCY MEDICINE

## 2025-05-10 RX ORDER — ACETAMINOPHEN 325 MG/1
650 TABLET ORAL EVERY 6 HOURS PRN
Qty: 30 TABLET | Refills: 0 | Status: SHIPPED | OUTPATIENT
Start: 2025-05-10

## 2025-05-10 RX ORDER — IBUPROFEN 600 MG/1
600 TABLET, FILM COATED ORAL EVERY 6 HOURS PRN
Qty: 28 TABLET | Refills: 0 | Status: SHIPPED | OUTPATIENT
Start: 2025-05-10 | End: 2025-05-17

## 2025-05-10 NOTE — DISCHARGE INSTRUCTIONS
Thank you for seeking care at [facility] Emergency Department  You have been seen and evaluated for abdominal pain and discomfort.    In the emergency department, you had labs, urine, ultrasound and CT done   Your testing did not show severe findings, except as noted: enlargement of the right fallopian tube 6.4 x 1.8 cm, uterine fibroid     Read all instructions provided.    Remember, your care process does not end after your visit today. Please follow-up with your doctor within 1-2 days for a follow-up visit to ensure your symptoms are improving, to see if you need any further evaluation/testing, or to evaluate for any alternate diagnoses. Return to the emergency department if you develop severe abdominal pain, severe nausea and vomiting to the point where you are unable to keep down fluids, if you develop chest pain or difficulty breathing, blood in your stool, dizziness or fainting, or if you develop any other new or concerning symptoms as these could be signs of more serious medical illness. Try to stay well hydrated.

## 2025-05-10 NOTE — ED QUICK NOTES
Rounding Completed    Plan of Care reviewed. Waiting for US result.  Elimination needs assessed.  Provided update. Patient denies any needs at this time.    Bed is locked and in lowest position. Call light within reach.

## 2025-05-10 NOTE — ED INITIAL ASSESSMENT (HPI)
Patient arrives to the ED ambulatory, w/ c/o lower abd pain that started yesterday, states the pain is now in the LLQ. Denies n/v/d.

## 2025-05-10 NOTE — ED PROVIDER NOTES
Patient Seen in: [facility] Emergency Department      History     Chief Complaint   Patient presents with    Abdomen/Flank Pain     Stated Complaint: Abdominal Pain    Subjective:   HPI    This is a 44-year-old female no significant past medical history presents to the ER with complaints of lower abdominal pain, left more than right.  Patient states that it started yesterday and has been constant and progressively worsening.  Denies nausea or vomiting, no diarrhea.  States has felt slightly constipated, had a small hard stool earlier but no bloody stool.  No dysuria, hematuria, frequency or pressure.  No vaginal bleeding or discharge.  No fevers or chills.  No recent fall or injury nor heavy lifting. Took tylenol around 12pm without relief.       History of Present Illness               Objective:     No pertinent past medical history.            No pertinent past surgical history.              No pertinent social history.                              Physical Exam     ED Triage Vitals [05/09/25 1915]   /68   Pulse 82   Resp 20   Temp 98.7 °F (37.1 °C)   Temp src    SpO2 100 %   O2 Device None (Room air)       Current Vitals:   Vital Signs  BP: 128/70  Pulse: 80  Resp: 19  Temp: 98.7 °F (37.1 °C)    Oxygen Therapy  SpO2: 100 %  O2 Device: None (Room air)        Physical Exam  Vitals and nursing note reviewed.   Constitutional:       General: She is not in acute distress.  HENT:      Head: Normocephalic and atraumatic.      Right Ear: External ear normal.      Left Ear: External ear normal.      Nose: Nose normal.      Mouth/Throat:      Mouth: Mucous membranes are moist.   Eyes:      Conjunctiva/sclera: Conjunctivae normal.   Cardiovascular:      Rate and Rhythm: Normal rate and regular rhythm.      Heart sounds: No murmur heard.  Pulmonary:      Effort: Pulmonary effort is normal. No respiratory distress.      Breath sounds: No wheezing, rhonchi or rales.   Abdominal:      General: There is no  distension.      Palpations: Abdomen is soft.      Tenderness: There is generalized abdominal tenderness. There is guarding. There is no right CVA tenderness, left CVA tenderness or rebound.      Comments: Generalized abd ttp, worse in LLQ, voluntary guarding throughout all areas of abdomen, no palpable mass   Genitourinary:     Comments: Cervix normal, no CMT, no friability, minimal white discharge. No adnexal ttp.   Musculoskeletal:         General: No deformity.   Skin:     General: Skin is warm and dry.      Capillary Refill: Capillary refill takes less than 2 seconds.      Findings: No rash.   Neurological:      General: No focal deficit present.      Mental Status: She is alert.           Physical Exam                ED Course     Labs Reviewed   CBC WITH DIFFERENTIAL WITH PLATELET - Abnormal; Notable for the following components:       Result Value    WBC 15.1 (*)     Neutrophil Absolute Prelim 12.23 (*)     Neutrophil Absolute 12.23 (*)     All other components within normal limits   COMP METABOLIC PANEL (14) - Abnormal; Notable for the following components:    Glucose 137 (*)     Potassium 3.3 (*)     Creatinine 1.04 (*)     BUN/CREA Ratio 8.7 (*)     Calcium, Total 8.4 (*)     All other components within normal limits   URINALYSIS WITH CULTURE REFLEX - Abnormal; Notable for the following components:    Ketones Urine Trace (*)     Blood Urine Trace (*)     Squamous Epi. Cells Few (*)     All other components within normal limits   LIPASE - Normal   POCT PREGNANCY URINE - Normal   CHLAMYDIA/GONOCOCCUS, TENZIN   VAGINITIS VAGINOSIS PCR PANEL             MDM     This patient presents with abdominal pain, diffusely tender on exam. VSS     Differential diagnosis includes:    Upper GI pathology including gastritis, PUD, cholecystitis, choledocholithiasis, pancreatitis, biliary colic, SBO     Lower GI pathology including diverticulitis, appendicitis, intraabdominal abscess, volvulus      pathology including  pregnancy, ovarian torsion, TOA, PID, kidney stone, pyelonephritis or UTI     Plan: will obtain cbc, cmp, lipase, upreg, UA/Ucx, CTAP for further disposition. Toradol for pain. If CT unrevealing may need TVUS to exclude ovarian pathology which I discussed with patient         ED Course as of 05/10/25 0401  ------------------------------------------------------------  Time: 05/09 2013  Comment: Cbc with leukocytosis and left shift. Upreg neg. UA unremarkable   ------------------------------------------------------------  Time: 05/09 2111  Value: CT ABDOMEN+PELVIS(CONTRAST ONLY)(CPT=74177)  Comment: Impression  CONCLUSION:     1. Diffuse thickening of the urinary bladder wall suspicious for cystitis.     2. Right adnexal tubular structure is seen, indeterminate.  Consider further evaluation with pelvic ultrasound.  The left adnexa is unremarkable.     3. The appendix is unremarkable.  No small bowel obstruction.  No colitis or diverticulitis.         Pt reevaluated updated with results. Still in pain but declines other pain meds right now. Discussed findings and pt agreeable to TVUS given adnexal finding.   ------------------------------------------------------------  Time: 05/09 5417  Value: US PELVIS EV W DOPPLER (REM=[id number]/[id number]/[id number])  Comment: ULTRASOUND PELVIS      IMPRESSION:  Comparison: Same day CT scan.    Adjacent to the right ovary there is a thick walled complex somewhat tubular area measuring 6.4 x 1.8 cm.  Taken with the prior CT this likely represents a thickened fallopian tube.  Correlate for the possibility of pyosalpinx/PID.  The adjacent right ovary is normal in size and contains Doppler flow.    Normal-sized left ovary containing Doppler flow.    No free fluid.    Normal-sized uterus.  1.5 cm intramural fibroid posteriorly.    ------------------------------------------------------------  Time: 05/10 0013  Comment: Case d/w Dr. Mohr recommends patient follow-up as an outpatient would hold off  on antibiotics.  Patient updated.  States feeling generally better and pain is subsided significantly.  Advised her to follow-up with OB/GYN, discussed incidental finding of fibroid.  Urine is not consistent with UTI.  Advise return if new or worsening symptoms but in the meantime do Tylenol and Motrin, heat packs, and follow-up as outpatient with primary doctor and OB/GYN.  She feels comfortable with the plan for discharge at this time       Disposition and Plan     Clinical Impression:  1. Fallopian tube disorder    2. Abdominal pain, generalized    3. Uterine leiomyoma, unspecified location         Disposition:  Discharge  5/10/2025 12:14 am    Follow-up:  Stephon De Anda DO  [address]  [place] [id number] [phone]    Schedule an appointment as soon as possible for a visit in 2 day(s)      Becki Hammond MD  [address]  [facility] [address] [phone]    Schedule an appointment as soon as possible for a visit in 1 week(s)      [facility] Emergency Department  [address]  [facility] [address] [phone]  Go to  If symptoms worsen, right away          Medications Prescribed:  Discharge Medication List as of 5/10/2025 12:25 AM        START taking these medications    Details   ibuprofen 600 MG Oral Tab Take 1 tablet (600 mg total) by mouth every 6 (six) hours as needed., Normal, Disp-28 tablet, R-0      acetaminophen (TYLENOL) 325 MG Oral Tab Take 2 tablets (650 mg total) by mouth every 6 (six) hours as needed., Normal, Disp-30 tablet, R-0             Supplementary Documentation:                                         Carley Valdez DO  Attending Physician  Emergency Medicine

## 2025-05-12 LAB
C TRACH DNA SPEC QL NAA+PROBE: NEGATIVE
N GONORRHOEA DNA SPEC QL NAA+PROBE: NEGATIVE

## 2025-05-13 ENCOUNTER — TELEMEDICINE (OUTPATIENT)
Dept: FAMILY MEDICINE CLINIC | Facility: CLINIC | Age: 45
End: 2025-05-13
Payer: COMMERCIAL

## 2025-05-13 DIAGNOSIS — R73.9 HYPERGLYCEMIA: Primary | ICD-10-CM

## 2025-05-13 DIAGNOSIS — E66.811 OBESITY (BMI 30.0-34.9): ICD-10-CM

## 2025-05-13 PROCEDURE — 99213 OFFICE O/P EST LOW 20 MIN: CPT | Performed by: FAMILY MEDICINE

## 2025-05-14 ENCOUNTER — PATIENT MESSAGE (OUTPATIENT)
Dept: OBGYN CLINIC | Facility: CLINIC | Age: 45
End: 2025-05-14

## 2025-05-14 ENCOUNTER — PATIENT MESSAGE (OUTPATIENT)
Dept: FAMILY MEDICINE CLINIC | Facility: CLINIC | Age: 45
End: 2025-05-14

## 2025-05-15 RX ORDER — FLUCONAZOLE 150 MG/1
TABLET ORAL
Qty: 2 TABLET | Refills: 0 | Status: SHIPPED | OUTPATIENT
Start: 2025-05-15

## 2025-05-15 RX ORDER — METRONIDAZOLE 500 MG/1
500 TABLET ORAL 2 TIMES DAILY
Qty: 14 TABLET | Refills: 0 | Status: SHIPPED | OUTPATIENT
Start: 2025-05-15

## 2025-05-15 NOTE — TELEPHONE ENCOUNTER
Reviewed chart pt last since 02/15/2024 by Dr. Mohr      Collected 5/10/2025 12:22 AM       Status: Final result    Specimen Information: Vaginal; Other         Component  Ref Range & Units (hover)    Bacterial Vaginosis Positive Abnormal    Candida group Positive Abnormal

## 2025-05-15 NOTE — TELEPHONE ENCOUNTER
Becki Hammond MD to Me (Selected Message)        5/15/25 12:20 PM  Yes, please treat.      Becki Mohr MD  Me to Becki Hammond MD  AD      5/15/25  7:33 AM  Pt saw seen in the ED was told you would treat BV and yeast.  Last visit with you was 02/15/2024.  Do you want me to place the orders?

## (undated) NOTE — MR AVS SNAPSHOT
After Visit Summary   1/24/2024    Mara Garcia   MRN: [id number]           Visit Information     Date & Time  1/24/2024  4:30 PM Provider  Alicia Joe MD Department  [facility] - OB/GYN Dept. Phone  [phone]      Your Vitals Were  Most recent update: 1/24/2024  4:41 PM    BP   112/68    Ht   64\"    Wt   206 lb    LMP   01/16/2024 (Exact Date)    BMI   35.36 kg/m²         Allergies as of 1/24/2024  Review status set to Review Complete on 1/24/2024   No Known Allergies     Your Current Medications        Dosage    fluticasone propionate 50 MCG/ACT Nasal Suspension 2 sprays by Each Nare route daily.      Diagnoses for This Visit    Screening for cervical cancer   [210773]  -  Primary  Women's annual routine gynecological examination   [6726739]    At risk for fertility problems   [450647]    Screening examination for venereal disease   [V74.5.ICD-9-CM]    Breast cancer screening by mammogram   [7549595]             We Ordered the Following     Normal Orders This Visit    Chlamydia/GC PCR Combo [4407655 CUSTOM]     Hpv Dna  High Risk , Thin Prep Collect [OFC7146 CUSTOM]     THINPREP PAP SMEAR ONLY [OTZ2487 CUSTOM]     ThinPrep PAP Smear [FIA9684 CUSTOM]     Future Labs/Procedures Expected by Expires    Anti-Müllerian Hormone (AMH) ([employer]) [ODL0865 CPT(R)]  1/24/2024 (Approximate) 1/23/2025    Chlamydia/GC PCR Combo [[id number] CUSTOM]  1/24/2024 (Approximate) 1/24/2025    FSH [[id number] CUSTOM]  1/24/2024 (Approximate) 1/23/2025    Hpv Dna  High Risk , Thin Prep Collect [EEC0530 CUSTOM]  1/24/2024 1/24/2025    SAKINA JUVENAL 2D+3D SCREENING BILAT (CPT=77067/88252) [COMBO CPT(R)]  1/24/2024 (Approximate) 1/24/2025    ThinPrep PAP Smear [XYS7527 CUSTOM]  1/24/2024 1/24/2025      Future Appointments        Provider Department    2/14/2024 2:00 PM Becki Hammond [facility] - OB/GYN      Imaging Scheduling  Instructions     Around January 24, 2024   Imaging:   [facility] JUVENAL 2D+3D SCREENING BILAT (CPT=77067/74164)    Instructions: To schedule a test at any [facility], call Central Scheduling at [phone], Monday through Friday between 7:30am to 6pm and on Saturday between 8am and 1pm.      [facility])        [address]    [place], [place]          It is the patient's responsibility to check with and follow their insurance company's guidelines for prior authorization for this test.  You may be held responsible for payment in full if proper authorization is not acquired.  Please contact the Patient Business Office at [phone] if you have   any questions related to insurance coverage.  Thank you.    Children: Children under the age of 12 must have another adult caregiver with them. Please do not bring your child/children without a caregiver. Because of the highly sensitive equipment and privacy of all our patients, children will not be permitted in the exam rooms, unless otherwise noted and in   accordance with departmental policy.                  Did you know that [facility] primary care physicians now offer Video Visits through [employer] for adult patients for a variety of conditions such as allergies, back pain and cold symptoms? Skip the drive and waiting room and online chat with a doctor face-to-face using your web-cam enabled computer or mobile device wherever you are. Video Visits cost $50 and can be paid hassle-free using a credit, debit, or health savings card.  Not active on [employer]? Ask us how to get signed up today!          If you receive a survey from [employer], please take a few minutes to complete it and provide feedback. We strive to deliver the best patient experience and are looking for ways to make improvements. Your feedback will help us do so. For more information on [employer], please visit  [email or web address]           No text in SmartText           No text in SmartText

## (undated) NOTE — LETTER
Date & Time: 2/23/2023, 12:18 PM  Patient: Mavis De La Rosa  Encounter Provider(s):    Peter Neumann.MICHELE       To Whom It May Concern:    Tre Mora was seen and treated in our department on 2/23/2023. She should be excused from work to return 2/28/23.     If you have any questions or concerns, please do not hesitate to call.        _____________________________  Physician/APC Signature

## (undated) NOTE — LETTER
AUTHORIZATION FOR SURGICAL OPERATION OR OTHER PROCEDURE    1. I hereby authorize Dr. Kenny Connell, and [facility] staff assigned to my case to perform the following operation and/or procedure at the [facility] site:    _______________________________________________________________________________________________    Cortisone injection to Left knee  _______________________________________________________________________________________________    2.  My physician has explained the nature and purpose of the operation or other procedure, possible alternative methods of treatment, the risks involved, and the possibility of complication to me.  I acknowledge that no guarantee has been made as to the result that may be obtained.  3.  I recognize that, during the course of this operation, or other procedure, unforseen conditions may necessitate additional or different procedure than those listed above.  I, therefore, further authorize and request that the above named physician, his/her physician assistants or designees perform such procedures as are, in his/her professional opinion, necessary and desirable.  4.  Any tissue or organs removed in the operation or other procedure may be disposed of by and at the discretion of the [facility] and [facility].  5.  I understand that in the event of a medical emergency, I will be transported by local paramedics to [facility] [place] or other hospital emergency department.  6.  I certify that I have read and fully understand the above consent to operation and/or other procedure.    7.  I acknowledge that my physician has explained sedation/analgesia administration to me including the risks and benefits.  I consent to the administration of sedation/analgesia as may be necessary or desirable in the judgement of my physician.    Witness signature: ___________________________________________________ Date:   ______/______/_____                    Time:  ________ A.M.  P.M.       Patient Name:  ______________________________________________________  (please print)      Patient signature:  ___________________________________________________             Relationship to Patient:           []  Parent    Responsible person                          []  Spouse  In case of minor or                    [] Other  _____________   Incompetent name:  __________________________________________________                               (please print)      _____________      Responsible person  In case of minor or  Incompetent signature:  _______________________________________________    Statement of Physician  My signature below affirms that prior to the time of the procedure, I have explained to the patient and/or his/her guardian, the risks and benefits involved in the proposed treatment and any reasonable alternative to the proposed treatment.  I have also explained the risks and benefits involved in the refusal of the proposed treatment and have answered the patient's questions.                        Date:  ______/______/_______  Provider                      Signature:  __________________________________________________________       Time:  ___________ A.M    P.M.

## (undated) NOTE — MR AVS SNAPSHOT
After Visit Summary   3/1/2021    Hema English    MRN: [id number]           Visit Information     Date & Time  3/1/2021  1:00 PM Provider  Minnie Watson, [address], [facility] Dept.  Phone  [phone] If you receive a survey from [employer], please take a few minutes to complete it and provide feedback. We strive to deliver the best patient experience and are looking for ways to make improvements. Your feedback will help us do so.  For more information EMERGENCY ROOM Life-threatening emergencies needing immediate intervention at a hospital emergency room.  Average cost  $2,300*   *Cost varies based on your insurance coverage  For more information about hours, locations or appointment options available at

## (undated) NOTE — LETTER
24    Refer for semen analysis for unexplained infertility.     Geovanni BERNAL 1983    [facility]:  [phone]                Becki Mohr MD  Please fax to [phone]

## (undated) NOTE — LETTER
Mara Garcia, :1980    CONSENT FOR PROCEDURE/SEDATION    1. I authorize the performance upon Mara Garcia  the following: [facility]     2. I authorize Dr. Becki Hammond MD (and whomever is designated as the doctor’s assistant), to perform the above-mentioned procedures.    3. If any unforeseen conditions arise during this procedure calling for additional  procedures, operations, or medications (including anesthesia and blood transfusion), I further request and authorize the doctor to do whatever he/she deems advisable in my interest.    4. I consent to the taking and reproduction of any photographs in the course of this procedure for professional purposes.    5. I consent to the administration of such sedation as may be considered necessary or advisable by the physician responsible for this service, with the exception of ______________________________________________________    6. I have been informed by my doctor of the nature and purpose of this procedure sedation, possible alternative methods of treatment, risk involved and possible complications.      Signature of Patient:_______________________________________________    Signature of person authorized to consent for patient:  _______________________________________________________________    Relationship to patient: ____________________________________________    Witness: _________________________________________ Date:___________     Physician Signature: _______________________________ Date:___________

## (undated) NOTE — LETTER
Date & Time: 2/23/2023, 10:15 AM  Patient: Sola Diaz  Encounter Provider(s):    MICHELE Fletcher       To Whom It May Concern:    Kirt Hilton was seen and treated in our department on 2/23/2023. She should be excused from work to return 2/27/23.     If you have any questions or concerns, please do not hesitate to call.        _____________________________  Physician/APC Signature